# Patient Record
Sex: MALE | Race: WHITE | Employment: UNEMPLOYED | URBAN - METROPOLITAN AREA
[De-identification: names, ages, dates, MRNs, and addresses within clinical notes are randomized per-mention and may not be internally consistent; named-entity substitution may affect disease eponyms.]

---

## 2024-06-06 ENCOUNTER — HOSPITAL ENCOUNTER (EMERGENCY)
Age: 69
Discharge: HOME | End: 2024-06-06
Attending: EMERGENCY MEDICINE
Payer: MEDICARE

## 2024-06-06 ENCOUNTER — APPOINTMENT (OUTPATIENT)
Dept: GENERAL RADIOLOGY | Age: 69
End: 2024-06-06
Payer: MEDICARE

## 2024-06-06 VITALS
RESPIRATION RATE: 16 BRPM | SYSTOLIC BLOOD PRESSURE: 108 MMHG | HEART RATE: 72 BPM | TEMPERATURE: 97.7 F | WEIGHT: 150 LBS | OXYGEN SATURATION: 98 % | HEIGHT: 73 IN | BODY MASS INDEX: 19.88 KG/M2 | DIASTOLIC BLOOD PRESSURE: 62 MMHG

## 2024-06-06 DIAGNOSIS — T85.598A FEEDING TUBE DYSFUNCTION, INITIAL ENCOUNTER: Primary | ICD-10-CM

## 2024-06-06 PROCEDURE — 49465 FLUORO EXAM OF G/COLON TUBE: CPT

## 2024-06-06 PROCEDURE — 43762 RPLC GTUBE NO REVJ TRC: CPT

## 2024-06-06 PROCEDURE — 99283 EMERGENCY DEPT VISIT LOW MDM: CPT

## 2024-06-06 PROCEDURE — 6360000004 HC RX CONTRAST MEDICATION: Performed by: PHYSICIAN ASSISTANT

## 2024-06-06 RX ADMIN — DIATRIZOATE MEGLUMINE AND DIATRIZOATE SODIUM 30 ML: 660; 100 LIQUID ORAL; RECTAL at 10:50

## 2024-06-06 NOTE — ED PROVIDER NOTES
with the patient.       I have reviewed the patient's previous medical records using the electronic health record that we have available.      Labs and imaging were reviewed.   CONSULTS:  None    PROCEDURES:  Procedures        FINAL IMPRESSION      1. Feeding tube dysfunction, initial encounter          DISPOSITION/PLAN   DISPOSITION Decision To Discharge 06/06/2024 11:57:17 AM      PATIENTREFERRED TO:   No follow-up provider specified.    DISCHARGE MEDICATIONS:   There are no discharge medications for this patient.          (Please note that portions of this note were completed with a voice recognition program.  Efforts were made to edit thedictations but occasionally words are mis-transcribed.)    WALTER Gross Matthew Christopher, PA-C  06/06/24 1709      eMERGENCY dEPARTMENT eNCOUnter   Independent Attestation     Pt Name: Devin Arthur  MRN: [id number]  Birthdate 1955  Date of evaluation: 6/6/24     Devin Arthur is a 68 y.o. male with CC: Feeding Tube Problem (Pt states feeding tube fell out PTA. )      Based on the medical record the care appears appropriate.  I was personally available for consultation in the Emergency Department.    Juan Manuel Ibarra MD  Attending Emergency Physician                 Juan Manuel Ibarra MD  06/06/24 3554

## 2024-06-06 NOTE — DISCHARGE INSTRUCTIONS
Take meds as prescribed.  Follow outpatient tomorrow with doctor or by calling [phone] or [phone].   Return to ER immediately if symptoms worsen or persist.

## 2024-06-07 ENCOUNTER — INITIAL CONSULT (OUTPATIENT)
Dept: ONCOLOGY | Age: 69
End: 2024-06-07
Payer: MEDICARE

## 2024-06-07 ENCOUNTER — HOSPITAL ENCOUNTER (OUTPATIENT)
Age: 69
Setting detail: SPECIMEN
Discharge: HOME | End: 2024-06-07
Payer: MEDICARE

## 2024-06-07 ENCOUNTER — TELEPHONE (OUTPATIENT)
Dept: ONCOLOGY | Age: 69
End: 2024-06-07

## 2024-06-07 VITALS
WEIGHT: 155 LBS | HEART RATE: 73 BPM | RESPIRATION RATE: 16 BRPM | BODY MASS INDEX: 20.54 KG/M2 | HEIGHT: 73 IN | DIASTOLIC BLOOD PRESSURE: 64 MMHG | SYSTOLIC BLOOD PRESSURE: 119 MMHG | TEMPERATURE: 96.3 F

## 2024-06-07 DIAGNOSIS — C09.9 TONSILLAR CANCER (HCC): ICD-10-CM

## 2024-06-07 DIAGNOSIS — R97.20 ELEVATED PROSTATE SPECIFIC ANTIGEN (PSA): ICD-10-CM

## 2024-06-07 DIAGNOSIS — C09.9 TONSILLAR CANCER (HCC): Primary | ICD-10-CM

## 2024-06-07 LAB
ALBUMIN SERPL-MCNC: 4.1 G/DL (ref 3.5–5.2)
ALP SERPL-CCNC: 111 U/L (ref 40–129)
ANION GAP SERPL CALCULATED.3IONS-SCNC: 10 MMOL/L (ref 9–17)
AST SERPL-CCNC: 23 U/L
BASOPHILS # BLD: 0.05 K/UL (ref 0–0.2)
BILIRUB SERPL-MCNC: 0.2 MG/DL (ref 0.3–1.2)
BUN SERPL-MCNC: 32 MG/DL (ref 8–23)
CALCIUM SERPL-MCNC: 10.2 MG/DL (ref 8.6–10.4)
CHLORIDE SERPL-SCNC: 100 MMOL/L (ref 98–107)
CO2 SERPL-SCNC: 24 MMOL/L (ref 20–31)
CREAT SERPL-MCNC: 1.4 MG/DL (ref 0.7–1.2)
EOSINOPHIL # BLD: 0.16 K/UL (ref 0–0.4)
EOSINOPHILS RELATIVE PERCENT: 3 % (ref 1–4)
ERYTHROCYTE [DISTWIDTH] IN BLOOD BY AUTOMATED COUNT: 12.7 % (ref 11.8–14.4)
GFR, ESTIMATED: 54 ML/MIN/1.73M2
GLUCOSE SERPL-MCNC: 85 MG/DL (ref 70–99)
HCT VFR BLD AUTO: 31.2 % (ref 40.7–50.3)
HGB BLD-MCNC: 10.1 G/DL (ref 13–17)
IMM GRANULOCYTES # BLD AUTO: 0.05 K/UL (ref 0–0.3)
IMM GRANULOCYTES NFR BLD: 1 %
MAGNESIUM SERPL-MCNC: 2.2 MG/DL (ref 1.6–2.6)
MCH RBC QN AUTO: 32.1 PG (ref 25.2–33.5)
MCHC RBC AUTO-ENTMCNC: 32.4 G/DL (ref 28–38)
MCV RBC AUTO: 99 FL (ref 82.6–102.9)
MONOCYTES NFR BLD: 0.49 K/UL (ref 0.2–0.8)
MONOCYTES NFR BLD: 9 % (ref 1–7)
NEUTROPHILS NFR BLD: 77 % (ref 36–66)
NEUTS SEG NFR BLD: 4.16 K/UL (ref 1.8–7.7)
PLATELET # BLD AUTO: 253 K/UL (ref 138–453)
PMV BLD AUTO: 8.3 FL (ref 8.1–13.5)
POTASSIUM SERPL-SCNC: 4.6 MMOL/L (ref 3.7–5.3)
PROT SERPL-MCNC: 6.6 G/DL (ref 6.4–8.3)
PSA SERPL-MCNC: 11.1 NG/ML (ref 0–4)
RBC # BLD AUTO: 3.15 M/UL (ref 4.21–5.77)
SODIUM SERPL-SCNC: 134 MMOL/L (ref 135–144)

## 2024-06-07 PROCEDURE — 85025 COMPLETE CBC W/AUTO DIFF WBC: CPT

## 2024-06-07 PROCEDURE — 80053 COMPREHEN METABOLIC PANEL: CPT

## 2024-06-07 PROCEDURE — 99204 OFFICE O/P NEW MOD 45 MIN: CPT | Performed by: INTERNAL MEDICINE

## 2024-06-07 PROCEDURE — 83735 ASSAY OF MAGNESIUM: CPT

## 2024-06-07 PROCEDURE — 84153 ASSAY OF PSA TOTAL: CPT

## 2024-06-07 PROCEDURE — 36415 COLL VENOUS BLD VENIPUNCTURE: CPT

## 2024-06-07 PROCEDURE — 99202 OFFICE O/P NEW SF 15 MIN: CPT | Performed by: INTERNAL MEDICINE

## 2024-06-07 RX ORDER — DIPHENHYDRAMINE HCL 25 MG
25 CAPSULE ORAL EVERY 6 HOURS PRN
COMMUNITY

## 2024-06-07 RX ORDER — TAMSULOSIN HYDROCHLORIDE 0.4 MG/1
0.4 CAPSULE ORAL DAILY
COMMUNITY
Start: 2024-01-02

## 2024-06-07 RX ORDER — ACETAMINOPHEN 325 MG/1
325 TABLET ORAL EVERY 6 HOURS PRN
COMMUNITY

## 2024-06-07 RX ORDER — FLUTICASONE PROPIONATE 50 MCG
2 SPRAY, SUSPENSION (ML) NASAL DAILY
COMMUNITY
Start: 2024-03-16 | End: 2025-03-17

## 2024-06-07 RX ORDER — ALPRAZOLAM 0.25 MG/1
0.25 TABLET ORAL 3 TIMES DAILY PRN
COMMUNITY
Start: 2024-02-02

## 2024-06-07 RX ORDER — IBUPROFEN 200 MG
200 TABLET ORAL EVERY 6 HOURS PRN
COMMUNITY

## 2024-06-07 RX ORDER — PANTOPRAZOLE SODIUM 20 MG/1
20 TABLET, DELAYED RELEASE ORAL
COMMUNITY

## 2024-06-07 NOTE — PROGRESS NOTES
Patient ID: Devin Arthur, 1955, [id number], 68 y.o.  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 PET scan shows no distant disease from head and neck although there is potentially prostate primary (biopsy was on hold)     Radiation therapy completed in First week of March 2024.    HISTORY OF PRESENT ILLNESS:    Oncologic History:  Devin Arthur is a 68 y.o. male with recent diagnosis of tonsillar cancer s/p chemoradiation     Patient received his chemoradiation treatment at [place] and now he is moving here to back to [place] a week ago.    Patient has feeding tube in place and mostky getting feeding through tube feeds. He complains of dry mouth and try eat some food orally.    Patient initially presented with worsening pain in the left base of tongue area as well as L neck swelling. He went to ER on 12/8/23 where CT showed 2.7 x 1.8 x 4.8cm enhancing left lateral pharynx and base of tongue mass involving the soft palate. There were also enlarged left (1.8 x 1.3 x 1.8cm, 2.4 x 1.3 x 2.4cm and 1.2 x 0.7 x 2.0cm) and right (1.5 x 0.8 x 1.9cm) lymph nodes. MRI brain was unremarkable.     Diagnosed with p16+ L base of tongue/tonsil SCC - [id number] or stage HERNESTO  - R cervical lymph node biopsy confirms SCC. PET scan shows no distant disease from head and neck although there is potentially prostate primary and then started on cisplatin based chemoradiation completed in March 2024.    Patient has been receiving pain medication with Fentanyl 25 mcg and   oxycodone 15 mg q4-6h prn ( through feeding tube)    Patient was supposed to have restaging PET however he moved to [place] now and is here to establish care.    No past medical history on file.    No past surgical history on file.    Allergies   Allergen Reactions    Grass Pollen(K-O-[id number]-T-Swt Álvaro) Other (See Comments)       Current Outpatient

## 2024-06-07 NOTE — TELEPHONE ENCOUNTER
KATHERINE HERE FOR CONSULTATION  Labs today  Referral to ENT  Palliative care referral  Referral to Nutrition  RTc 2-3 weeks  LABS DONE ON EXIT  REFERRAL FOR DR TREVINO IS ON 6/11/24 @ 10AM  PALLIATIVE REFERRAL WILL REACHOUT TO PT   FOR AN APPT   REFERRAL FOR NUTRITION SHE WILL REACHOUT  TO PT  MD VISIT 6/28/24 @ 11AM  AVS PRINTED W/ INSTRUCTIONS AND GIVEN  TO PT ON EXIT

## 2024-06-18 ENCOUNTER — TELEPHONE (OUTPATIENT)
Dept: ONCOLOGY | Age: 69
End: 2024-06-18

## 2024-06-18 NOTE — TELEPHONE ENCOUNTER
[facility]- MEDICAL NUTRITION THERAPY     Visit Type: Initial  Reason for Assessment: MD Consult- h/o tonsillar cancer, on tube feeding.    NUTRITION RECOMMENDATIONS / MONITORING / EVALUATION  Encouraged small frequent meal intake and continued use of oral nutrition supplements.   Continue TF for now; work towards weaning off as able.  Will monitor PO/TF tolerance, adequacy of intake, weight, and care plans.     Subjective/Current Data:  Devin Arthur is a 68 y.o. male with h/o tonsillar cancer, s/p chemoradiation in [place]. Pt recently moved to [place] and establishing care at [facility]. Writer received referral from Dr Atkinson for \"management of malnutrition and tube feedings\".   Writer called and spoke with patient's spouse, Miryam. Spouse reports patient does not have an appetite, taste is off, and oral intake is minimal (bites). States pt is swallowing ok and is doing exercises daily. Pt remains on tube feeding as main source of nutrition. Currently receives 5-7 cans of Vital AF 1.2 per day via PEG (supplies through [employer]). Tolerating TF well. Wife reports pt also started drinking Boost supplements per ENT recommendation; wife reports currently drinking about 1/day (Boost Original). Writer discussed getting Boost Plus for higher amount of calories/protein per bottle; wife agreeable.   Writer encouraged continuing to transition from TF to oral diet as able; encouraged eating small frequent meals/snacks and use of oral nutrition supplements (Boost Plus, or equivalent) in place of TF. Discussed trying to cut down on amount of TF being provided to help with appetite/oral intake; wife reports she has tried this and patient still is not interested in eating much. Wife reports she is aiming for at least 2000 cals/day via oral diet/TF to help with weight maintenance/gain. Reports his weight range was 174-178 lbs in Dec 2023 (prior to treatment) and went as low as 125 lbs. Current weight is 155 lbs as

## 2024-06-21 ENCOUNTER — HOSPITAL ENCOUNTER (OUTPATIENT)
Dept: NUCLEAR MEDICINE | Age: 69
End: 2024-06-21
Attending: FAMILY MEDICINE
Payer: MEDICARE

## 2024-06-21 DIAGNOSIS — C09.9 TONSIL CANCER (HCC): ICD-10-CM

## 2024-06-21 LAB — GLUCOSE BLD-MCNC: 95 MG/DL (ref 75–110)

## 2024-06-21 PROCEDURE — 3430000000 HC RX DIAGNOSTIC RADIOPHARMACEUTICAL: Performed by: FAMILY MEDICINE

## 2024-06-21 PROCEDURE — 82947 ASSAY GLUCOSE BLOOD QUANT: CPT

## 2024-06-21 PROCEDURE — 2580000003 HC RX 258: Performed by: FAMILY MEDICINE

## 2024-06-21 PROCEDURE — 78815 PET IMAGE W/CT SKULL-THIGH: CPT

## 2024-06-21 PROCEDURE — A9609 HC RX DIAGNOSTIC RADIOPHARMACEUTICAL: HCPCS | Performed by: FAMILY MEDICINE

## 2024-06-21 RX ORDER — FLUDEOXYGLUCOSE F 18 200 MCI/ML
10 INJECTION, SOLUTION INTRAVENOUS
Status: COMPLETED | OUTPATIENT
Start: 2024-06-21 | End: 2024-06-21

## 2024-06-21 RX ORDER — SODIUM CHLORIDE 0.9 % (FLUSH) 0.9 %
10 SYRINGE (ML) INJECTION ONCE
Status: COMPLETED | OUTPATIENT
Start: 2024-06-21 | End: 2024-06-21

## 2024-06-21 RX ADMIN — FLUDEOXYGLUCOSE F 18 10.6 MILLICURIE: 200 INJECTION, SOLUTION INTRAVENOUS at 08:22

## 2024-06-21 RX ADMIN — SODIUM CHLORIDE, PRESERVATIVE FREE 10 ML: 5 INJECTION INTRAVENOUS at 08:17

## 2024-06-28 ENCOUNTER — TELEPHONE (OUTPATIENT)
Dept: UROLOGY | Age: 69
End: 2024-06-28

## 2024-06-28 ENCOUNTER — HOSPITAL ENCOUNTER (OUTPATIENT)
Dept: INFUSION THERAPY | Facility: MEDICAL CENTER | Age: 69
Discharge: HOME | End: 2024-06-28
Payer: MEDICARE

## 2024-06-28 ENCOUNTER — OFFICE VISIT (OUTPATIENT)
Dept: ONCOLOGY | Age: 69
End: 2024-06-28
Payer: MEDICARE

## 2024-06-28 ENCOUNTER — TELEPHONE (OUTPATIENT)
Dept: ONCOLOGY | Age: 69
End: 2024-06-28

## 2024-06-28 VITALS
BODY MASS INDEX: 20.95 KG/M2 | TEMPERATURE: 97 F | DIASTOLIC BLOOD PRESSURE: 68 MMHG | WEIGHT: 158.8 LBS | SYSTOLIC BLOOD PRESSURE: 106 MMHG | RESPIRATION RATE: 18 BRPM | HEART RATE: 60 BPM

## 2024-06-28 DIAGNOSIS — R97.20 ELEVATED PSA: Primary | ICD-10-CM

## 2024-06-28 DIAGNOSIS — C09.9 TONSILLAR CANCER (HCC): Primary | ICD-10-CM

## 2024-06-28 PROCEDURE — 99214 OFFICE O/P EST MOD 30 MIN: CPT | Performed by: INTERNAL MEDICINE

## 2024-06-28 PROCEDURE — 1123F ACP DISCUSS/DSCN MKR DOCD: CPT | Performed by: INTERNAL MEDICINE

## 2024-06-28 PROCEDURE — 99211 OFF/OP EST MAY X REQ PHY/QHP: CPT | Performed by: INTERNAL MEDICINE

## 2024-06-28 PROCEDURE — 2580000003 HC RX 258: Performed by: INTERNAL MEDICINE

## 2024-06-28 PROCEDURE — 96523 IRRIG DRUG DELIVERY DEVICE: CPT

## 2024-06-28 PROCEDURE — 6360000002 HC RX W HCPCS: Performed by: INTERNAL MEDICINE

## 2024-06-28 RX ORDER — EPINEPHRINE 1 MG/ML
0.3 INJECTION, SOLUTION INTRAMUSCULAR; SUBCUTANEOUS PRN
OUTPATIENT
Start: 2024-06-28

## 2024-06-28 RX ORDER — HEPARIN 100 UNIT/ML
500 SYRINGE INTRAVENOUS PRN
OUTPATIENT
Start: 2024-06-28

## 2024-06-28 RX ORDER — EPINEPHRINE 1 MG/ML
0.3 INJECTION, SOLUTION INTRAMUSCULAR; SUBCUTANEOUS PRN
Status: CANCELLED | OUTPATIENT
Start: 2024-06-28

## 2024-06-28 RX ORDER — HEPARIN 100 UNIT/ML
500 SYRINGE INTRAVENOUS PRN
Status: DISCONTINUED | OUTPATIENT
Start: 2024-06-28 | End: 2024-06-29 | Stop reason: HOSPADM

## 2024-06-28 RX ORDER — ALBUTEROL SULFATE 90 UG/1
4 AEROSOL, METERED RESPIRATORY (INHALATION) PRN
OUTPATIENT
Start: 2024-06-28

## 2024-06-28 RX ORDER — ONDANSETRON 2 MG/ML
8 INJECTION INTRAMUSCULAR; INTRAVENOUS
Status: CANCELLED | OUTPATIENT
Start: 2024-06-28

## 2024-06-28 RX ORDER — MIRTAZAPINE 15 MG/1
7.5 TABLET, FILM COATED ORAL NIGHTLY
COMMUNITY

## 2024-06-28 RX ORDER — SODIUM CHLORIDE 9 MG/ML
25 INJECTION, SOLUTION INTRAVENOUS PRN
Status: CANCELLED | OUTPATIENT
Start: 2024-06-28

## 2024-06-28 RX ORDER — DIPHENHYDRAMINE HYDROCHLORIDE 50 MG/ML
50 INJECTION INTRAMUSCULAR; INTRAVENOUS
OUTPATIENT
Start: 2024-06-28

## 2024-06-28 RX ORDER — ALBUTEROL SULFATE 90 UG/1
4 AEROSOL, METERED RESPIRATORY (INHALATION) PRN
Status: CANCELLED | OUTPATIENT
Start: 2024-06-28

## 2024-06-28 RX ORDER — SODIUM CHLORIDE 0.9 % (FLUSH) 0.9 %
5-40 SYRINGE (ML) INJECTION PRN
Status: DISCONTINUED | OUTPATIENT
Start: 2024-06-28 | End: 2024-06-29 | Stop reason: HOSPADM

## 2024-06-28 RX ORDER — SODIUM CHLORIDE 9 MG/ML
INJECTION, SOLUTION INTRAVENOUS CONTINUOUS
OUTPATIENT
Start: 2024-06-28

## 2024-06-28 RX ORDER — SODIUM CHLORIDE 9 MG/ML
25 INJECTION, SOLUTION INTRAVENOUS PRN
OUTPATIENT
Start: 2024-06-28

## 2024-06-28 RX ORDER — ACETAMINOPHEN 325 MG/1
650 TABLET ORAL
Status: CANCELLED | OUTPATIENT
Start: 2024-06-28

## 2024-06-28 RX ORDER — DIPHENHYDRAMINE HYDROCHLORIDE 50 MG/ML
50 INJECTION INTRAMUSCULAR; INTRAVENOUS
Status: CANCELLED | OUTPATIENT
Start: 2024-06-28

## 2024-06-28 RX ORDER — SODIUM CHLORIDE 0.9 % (FLUSH) 0.9 %
5-40 SYRINGE (ML) INJECTION PRN
OUTPATIENT
Start: 2024-06-28

## 2024-06-28 RX ORDER — FAMOTIDINE 10 MG/ML
20 INJECTION, SOLUTION INTRAVENOUS
Status: CANCELLED | OUTPATIENT
Start: 2024-06-28

## 2024-06-28 RX ORDER — ONDANSETRON 2 MG/ML
8 INJECTION INTRAMUSCULAR; INTRAVENOUS
OUTPATIENT
Start: 2024-06-28

## 2024-06-28 RX ORDER — ACETAMINOPHEN 325 MG/1
650 TABLET ORAL
OUTPATIENT
Start: 2024-06-28

## 2024-06-28 RX ORDER — SODIUM CHLORIDE 9 MG/ML
INJECTION, SOLUTION INTRAVENOUS CONTINUOUS
Status: CANCELLED | OUTPATIENT
Start: 2024-06-28

## 2024-06-28 RX ORDER — FAMOTIDINE 10 MG/ML
20 INJECTION, SOLUTION INTRAVENOUS
OUTPATIENT
Start: 2024-06-28

## 2024-06-28 RX ADMIN — SODIUM CHLORIDE, PRESERVATIVE FREE 20 ML: 5 INJECTION INTRAVENOUS at 12:05

## 2024-06-28 RX ADMIN — SODIUM CHLORIDE, PRESERVATIVE FREE 10 ML: 5 INJECTION INTRAVENOUS at 12:00

## 2024-06-28 RX ADMIN — Medication 500 UNITS: at 12:05

## 2024-06-28 NOTE — PROGRESS NOTES
Chin Atkinson MD  Hematologist/Medical Oncologist    On this date 6/28/24  I have spent 40 minutes reviewing previous notes, test results and face to face with the patient discussing the diagnosis and importance of compliance with the treatment plan. Greater than 50% of that time was spent face-to-face with the patient in counseling and coordinating her care.     This note is created with the assistance of a speech recognition program.  While intending to generate a document that actually reflects the content of the visit, the document can still have some errors including those of syntax and sound a like substitutions which may escape proof reading.  It such instances, actual meaning can be extrapolated by contextual diversion.

## 2024-06-28 NOTE — TELEPHONE ENCOUNTER
KATHERINE HERE FOR MD VISIT & TX  Referral urology  RTC July 10th  PORT flush today  REFERRAL FOR UROLOGY WAS CALLED THEY WILL  CALL THE PT FOR AN APPT  PORT FLUSH WAS DONE ON EXIT  MD VISIT 7/10/24 @ 1:15PM  AVS PRINTED W/ INSTRUCTIONS AND GIVEN TO  PT ON EXIT

## 2024-06-28 NOTE — PROGRESS NOTES
Pt seen per md and states has port that needs flushed, not done since March.  Pt tolerated well and very good blood return. De-accessed and discharged per amb with wife with next appts from  per AVS.

## 2024-07-01 ENCOUNTER — CLINICAL DOCUMENTATION (OUTPATIENT)
Facility: HOSPITAL | Age: 69
End: 2024-07-01

## 2024-07-08 ENCOUNTER — OFFICE VISIT (OUTPATIENT)
Dept: UROLOGY | Age: 69
End: 2024-07-08
Payer: MEDICARE

## 2024-07-08 VITALS
BODY MASS INDEX: 21.2 KG/M2 | WEIGHT: 160 LBS | HEIGHT: 73 IN | DIASTOLIC BLOOD PRESSURE: 78 MMHG | SYSTOLIC BLOOD PRESSURE: 118 MMHG

## 2024-07-08 DIAGNOSIS — N40.1 BPH WITH OBSTRUCTION/LOWER URINARY TRACT SYMPTOMS: ICD-10-CM

## 2024-07-08 DIAGNOSIS — N13.8 BPH WITH OBSTRUCTION/LOWER URINARY TRACT SYMPTOMS: ICD-10-CM

## 2024-07-08 DIAGNOSIS — R35.1 NOCTURIA: ICD-10-CM

## 2024-07-08 DIAGNOSIS — R97.20 ELEVATED PSA: Primary | ICD-10-CM

## 2024-07-08 PROCEDURE — 99204 OFFICE O/P NEW MOD 45 MIN: CPT | Performed by: UROLOGY

## 2024-07-08 PROCEDURE — 1123F ACP DISCUSS/DSCN MKR DOCD: CPT | Performed by: UROLOGY

## 2024-07-08 RX ORDER — TAMSULOSIN HYDROCHLORIDE 0.4 MG/1
0.4 CAPSULE ORAL DAILY
Qty: 90 CAPSULE | Refills: 1 | Status: SHIPPED | OUTPATIENT
Start: 2024-07-08

## 2024-07-08 ASSESSMENT — ENCOUNTER SYMPTOMS
VOMITING: 0
ABDOMINAL PAIN: 0
EYE PAIN: 0
COLOR CHANGE: 0
WHEEZING: 0
NAUSEA: 0
SHORTNESS OF BREATH: 0
BACK PAIN: 0
COUGH: 0
EYE REDNESS: 0

## 2024-07-08 NOTE — PROGRESS NOTES
[facility] UROLOGY [facility]  [address]  [facility] [address]  Dept: [phone]  Dept Fax: [phone]    [facility] Urology Office Note - New patient    Patient:  Devin Arthur  YOB: 1955  Date: 7/8/2024    The patient is a 68 y.o. male who presents todayfor evaluation of the following problems:   Chief Complaint   Patient presents with    New Patient     Elevated PSA    referred by Sukhdeep Dunn MD.      HPI  This is a very pleasant 68-year-old gentleman who has an elevated PSA of 11.  His PSA within the last 6 months was as high as 17.  He has seen a few urologists but has not had a prostate biopsy yet because he was undergoing treatment for throat cancer.  He was recently given for an clearance of his throat cancer and is now seeing us for an opinion about his PSA.  He does have some bothersome urinary symptoms.  Most notably he has nocturia 5-6 times per night.  He is very bothered by the symptoms.    (Patient's old records have been requested, reviewed and summarized in today's note.)    Summary of old records: N/A    Additional History: N/A    Procedures Today: N/A    Last several PSA's:  Lab Results   Component Value Date    PSA 11.10 (H) 06/07/2024     Last total testosterone:  No results found for: \"TESTOSTERONE\"  Urinalysis today:  No results found for this visit on 07/08/24.    AUA Symptom Score (7/8/2024):                               Last BUN and creatinine:  Lab Results   Component Value Date    BUN 32 (H) 06/07/2024     Lab Results   Component Value Date    CREATININE 1.4 (H) 06/07/2024       Additional Lab/Culture results: none    Imaging Reviewed during this Office Visit: none  (results were independently reviewed by physician and radiology report verified)    PAST MEDICAL, FAMILY AND SOCIAL HISTORY:  No past medical history on file.  Past Surgical History:   Procedure Laterality Date    SHOULDER SURGERY Right

## 2024-07-08 NOTE — PROGRESS NOTES
Review of Systems   Constitutional:  Negative for activity change, chills and fever.   Eyes:  Negative for pain, redness and visual disturbance.   Respiratory:  Negative for cough, shortness of breath and wheezing.    Cardiovascular:  Negative for chest pain and leg swelling.   Gastrointestinal:  Negative for abdominal pain, nausea and vomiting.   Genitourinary:  Negative for difficulty urinating, dysuria, flank pain, frequency, hematuria, penile discharge, scrotal swelling and testicular pain.   Musculoskeletal:  Negative for back pain, joint swelling and myalgias.   Skin:  Negative for color change and rash.   Neurological:  Negative for dizziness, tremors and numbness.   Hematological:  Negative for adenopathy. Does not bruise/bleed easily.

## 2024-07-10 ENCOUNTER — TELEPHONE (OUTPATIENT)
Dept: ONCOLOGY | Age: 69
End: 2024-07-10

## 2024-07-10 ENCOUNTER — OFFICE VISIT (OUTPATIENT)
Dept: ONCOLOGY | Age: 69
End: 2024-07-10
Payer: MEDICARE

## 2024-07-10 VITALS
RESPIRATION RATE: 18 BRPM | SYSTOLIC BLOOD PRESSURE: 120 MMHG | WEIGHT: 160 LBS | DIASTOLIC BLOOD PRESSURE: 82 MMHG | BODY MASS INDEX: 21.11 KG/M2 | HEART RATE: 60 BPM | TEMPERATURE: 97.4 F

## 2024-07-10 DIAGNOSIS — D64.9 ANEMIA, UNSPECIFIED TYPE: Primary | ICD-10-CM

## 2024-07-10 PROCEDURE — 1123F ACP DISCUSS/DSCN MKR DOCD: CPT | Performed by: INTERNAL MEDICINE

## 2024-07-10 PROCEDURE — 99211 OFF/OP EST MAY X REQ PHY/QHP: CPT | Performed by: INTERNAL MEDICINE

## 2024-07-10 PROCEDURE — 99214 OFFICE O/P EST MOD 30 MIN: CPT | Performed by: INTERNAL MEDICINE

## 2024-07-10 NOTE — TELEPHONE ENCOUNTER
KATHERINE HERE FOR MD VISIT  RTC in 6 week w labs  LAB ORDERS GIVEN TO PT ON EXIT  TO BE DONE 1 WK PRIOR TO RV 8/28/24  MD VISIT 8/28/24 @ 3:15PM  AVS PRINTED W/ INSTRUCTIONS AND GIVEN  TO PT ON EXIT

## 2024-07-10 NOTE — PROGRESS NOTES
tonsillar carcinoma, p16 positive, status post chemoradiation with his treatment completed at [place] now moving to [facility].    I reviewed the lumbar data, imaging studies, outside records, prior records, discussed diagnosis and prognosis and treatment recommendations.    I reviewed the NCCN guidelines and goals of care.    Patient has completed chemoradiation in March of this year and was supposed to have follow-up restaging PET scan.  His PET scan has been ordered by his primary care physician and will be scheduled soon.    Patient still has a feeding tube in place and I will refer him to nutritionist    He will be followed up as per NCCN guidelines    During today's visit, the patient and the family had a number of reasonable questions which were answered to their satisfaction.  They verbalized understanding of the information provided and they agreed to proceed as outlined above.        PLAN:   I reviewed images, discussed diagnosis treatment recommendations   Recent PET scan showed no definite metabolically active tonsillar mass or cervical   Clinically he is recovering slowly   He still has a lot of dryness   He is following with urologist for his elevated PSA   He is following with ENT locally   Labs prior or earlier if needed       Chin Atkinson MD  Hematologist/Medical Oncologist    On this date 7/10/24  I have spent 40 minutes reviewing previous notes, test results and face to face with the patient discussing the diagnosis and importance of compliance with the treatment plan. Greater than 50% of that time was spent face-to-face with the patient in counseling and coordinating her care.     This note is created with the assistance of a speech recognition program.  While intending to generate a document that actually reflects the content of the visit, the document can still have some errors including those of syntax and sound a like substitutions which may escape proof reading.  It such

## 2024-07-15 ENCOUNTER — TELEPHONE (OUTPATIENT)
Dept: UROLOGY | Age: 69
End: 2024-07-15

## 2024-07-15 ENCOUNTER — HOSPITAL ENCOUNTER (OUTPATIENT)
Dept: MRI IMAGING | Age: 69
Discharge: HOME | End: 2024-07-17
Attending: UROLOGY

## 2024-07-15 DIAGNOSIS — R97.20 ELEVATED PSA: Primary | ICD-10-CM

## 2024-07-15 DIAGNOSIS — F40.240 CLAUSTROPHOBIA: ICD-10-CM

## 2024-07-15 DIAGNOSIS — R97.20 ELEVATED PSA: ICD-10-CM

## 2024-07-16 RX ORDER — DIAZEPAM 10 MG
10 TABLET ORAL ONCE
Qty: 1 TABLET | Refills: 0 | Status: SHIPPED | OUTPATIENT
Start: 2024-07-16 | End: 2024-07-16

## 2024-07-19 ENCOUNTER — HOSPITAL ENCOUNTER (OUTPATIENT)
Dept: MRI IMAGING | Age: 69
End: 2024-07-19
Attending: UROLOGY
Payer: MEDICARE

## 2024-07-19 PROCEDURE — 6360000004 HC RX CONTRAST MEDICATION: Performed by: UROLOGY

## 2024-07-19 PROCEDURE — A9576 INJ PROHANCE MULTIPACK: HCPCS | Performed by: UROLOGY

## 2024-07-19 PROCEDURE — 72197 MRI PELVIS W/O & W/DYE: CPT

## 2024-07-19 PROCEDURE — 2580000003 HC RX 258: Performed by: UROLOGY

## 2024-07-19 RX ORDER — 0.9 % SODIUM CHLORIDE 0.9 %
30 INTRAVENOUS SOLUTION INTRAVENOUS ONCE
Status: COMPLETED | OUTPATIENT
Start: 2024-07-19 | End: 2024-07-19

## 2024-07-19 RX ADMIN — GADOTERIDOL 14 ML: 279.3 INJECTION, SOLUTION INTRAVENOUS at 10:49

## 2024-07-19 RX ADMIN — SODIUM CHLORIDE 30 ML: 0.9 INJECTION, SOLUTION INTRAVENOUS at 10:50

## 2024-07-29 ENCOUNTER — OFFICE VISIT (OUTPATIENT)
Dept: UROLOGY | Age: 69
End: 2024-07-29
Payer: MEDICARE

## 2024-07-29 VITALS
SYSTOLIC BLOOD PRESSURE: 124 MMHG | TEMPERATURE: 98.2 F | HEIGHT: 73 IN | DIASTOLIC BLOOD PRESSURE: 76 MMHG | WEIGHT: 160 LBS | BODY MASS INDEX: 21.2 KG/M2 | OXYGEN SATURATION: 98 % | HEART RATE: 72 BPM

## 2024-07-29 DIAGNOSIS — N13.8 BPH WITH OBSTRUCTION/LOWER URINARY TRACT SYMPTOMS: ICD-10-CM

## 2024-07-29 DIAGNOSIS — N40.1 BPH WITH OBSTRUCTION/LOWER URINARY TRACT SYMPTOMS: ICD-10-CM

## 2024-07-29 DIAGNOSIS — R97.20 ELEVATED PSA: Primary | ICD-10-CM

## 2024-07-29 PROCEDURE — 1123F ACP DISCUSS/DSCN MKR DOCD: CPT | Performed by: UROLOGY

## 2024-07-29 PROCEDURE — 99214 OFFICE O/P EST MOD 30 MIN: CPT | Performed by: UROLOGY

## 2024-07-29 RX ORDER — CIPROFLOXACIN 500 MG/1
500 TABLET, FILM COATED ORAL 2 TIMES DAILY
Qty: 6 TABLET | Refills: 0 | Status: SHIPPED | OUTPATIENT
Start: 2024-07-29

## 2024-07-29 ASSESSMENT — ENCOUNTER SYMPTOMS
COUGH: 0
EYE REDNESS: 0
NAUSEA: 0
EYES NEGATIVE: 1
BACK PAIN: 0
ALLERGIC/IMMUNOLOGIC NEGATIVE: 1
VOMITING: 0
EYE PAIN: 0
COLOR CHANGE: 0
GASTROINTESTINAL NEGATIVE: 1
ABDOMINAL PAIN: 0
SHORTNESS OF BREATH: 0
RESPIRATORY NEGATIVE: 1
WHEEZING: 0

## 2024-07-29 NOTE — PROGRESS NOTES
[facility]OLOGY [facility]  [address]  [facility] [address]  Dept: [phone]    [facility] Urology Office Note - Established    Patient:  Devin Arthur  YOB: 1955  Date: 7/29/2024    The patient is a 68 y.o. male who presents todayfor evaluation of the following problems:   Chief Complaint   Patient presents with    Elevated PSA     4 wk f/u prostate mri.         HPI  This is a very pleasant 68-year-old gentleman with an elevated PSA.  He was undergoing treatments for throat cancer and therefore was not seen until a few weeks ago for his elevated PSA.  He does have some bothersome urinary symptoms.  We had ordered a prostate MRI.  He does have a PI-RADS 5 lesion.    Summary of old records: N/A    Additional History: N/A    Procedures Today: N/A    Urinalysis today:  No results found for this visit on 07/29/24.  Last several PSA's:  Lab Results   Component Value Date    PSA 11.10 (H) 06/07/2024     Last total testosterone:  No results found for: \"TESTOSTERONE\"    AUA Symptom Score (7/29/2024):                               Last BUN and creatinine:  Lab Results   Component Value Date    BUN 32 (H) 06/07/2024     Lab Results   Component Value Date    CREATININE 1.4 (H) 06/07/2024       Additional Lab/Culture results: none    Imaging Reviewed during this Office Visit: none  (results were independently reviewed by physician and radiology report verified)    PAST MEDICAL, FAMILY AND SOCIAL HISTORY UPDATE:  No past medical history on file.  Past Surgical History:   Procedure Laterality Date    SHOULDER SURGERY Right 2009     No family history on file.  Outpatient Medications Marked as Taking for the 7/29/24 encounter (Office Visit) with Douglas Silva MD   Medication Sig Dispense Refill    ciprofloxacin (CIPRO) 500 MG tablet Take 1 tablet by mouth 2 times daily Take first dose 1 day prior to prostate biopsy and then twice a day

## 2024-07-30 ENCOUNTER — TELEPHONE (OUTPATIENT)
Dept: UROLOGY | Age: 69
End: 2024-07-30

## 2024-07-30 NOTE — TELEPHONE ENCOUNTER
Fusion Prostate bx & us @ [facility] 9/5/24 8:15am **STOP BLOOD THINNERS 8/29/24**   PAT @ [facility] 8/22/24 10:30am       Dr. Silva ordered antibiotic- patient notified       Spoke with patient, procedure info given to patient

## 2024-07-31 ENCOUNTER — TELEPHONE (OUTPATIENT)
Dept: ONCOLOGY | Age: 69
End: 2024-07-31

## 2024-07-31 ENCOUNTER — INITIAL CONSULT (OUTPATIENT)
Dept: PALLATIVE CARE | Age: 69
End: 2024-07-31

## 2024-07-31 ENCOUNTER — TELEPHONE (OUTPATIENT)
Dept: INFUSION THERAPY | Age: 69
End: 2024-07-31

## 2024-07-31 VITALS
RESPIRATION RATE: 20 BRPM | SYSTOLIC BLOOD PRESSURE: 121 MMHG | DIASTOLIC BLOOD PRESSURE: 71 MMHG | HEIGHT: 76 IN | TEMPERATURE: 97.9 F | WEIGHT: 159 LBS | HEART RATE: 68 BPM | BODY MASS INDEX: 19.36 KG/M2

## 2024-07-31 DIAGNOSIS — C09.9 SQUAMOUS CELL CARCINOMA OF TONSIL (HCC): ICD-10-CM

## 2024-07-31 DIAGNOSIS — F41.1 GENERALIZED ANXIETY DISORDER: ICD-10-CM

## 2024-07-31 DIAGNOSIS — F33.9 EPISODE OF RECURRENT MAJOR DEPRESSIVE DISORDER, UNSPECIFIED DEPRESSION EPISODE SEVERITY (HCC): ICD-10-CM

## 2024-07-31 DIAGNOSIS — N40.1 BENIGN PROSTATIC HYPERPLASIA WITH LOWER URINARY TRACT SYMPTOMS, SYMPTOM DETAILS UNSPECIFIED: ICD-10-CM

## 2024-07-31 DIAGNOSIS — G47.01 INSOMNIA DUE TO MEDICAL CONDITION: Primary | ICD-10-CM

## 2024-07-31 RX ORDER — TRAZODONE HYDROCHLORIDE 50 MG/1
25 TABLET ORAL NIGHTLY
Qty: 4 TABLET | Refills: 0 | Status: SHIPPED | OUTPATIENT
Start: 2024-07-31 | End: 2024-08-08

## 2024-07-31 RX ORDER — LORAZEPAM 0.5 MG/1
0.5 TABLET ORAL EVERY 8 HOURS PRN
Qty: 90 TABLET | Refills: 0 | Status: SHIPPED | OUTPATIENT
Start: 2024-07-31 | End: 2024-08-30

## 2024-07-31 NOTE — TELEPHONE ENCOUNTER
[facility] Oncology Nutrition Note:   Chart reviewed/called patient for nutrition follow-up. No answer. Detailed message left with callback number. Await call back or attempt to contact patient at a later date.

## 2024-07-31 NOTE — PROGRESS NOTES
activity/some disease; full self-care; normal intake; LOC full  ___80% ambulation full; normal activity with effort/some disease; full self-care; normal/reduced intake; LOC full  ___70% ambulation reduced; cannot do normal work/some disease; full self-care; normal/reduce intake; LOC full  _x_60%  Ambulation reduced; Significant disease; Can't do hobbies/housework; intake normal or reduced; occasional assist; LOC full/confusion  ___50%  Mainly sit/lie; Extensive disease; Can't do any work; Considerable assist; intake normal or reduced; LOC full/confusion  ___40%  Mainly in bed; Extensive disease; Mainly assist; intake normal or reduced; LOC full/confusion   ___30%  Bed Bound; Extensive disease; Total care; intake reduced; LOC full/confusion  ___20%  Bed Bound; Extensive disease; Total care; intake minimal; Drowsy/coma  ___10%  Bed Bound; Extensive disease; Total care; Mouth care only; Drowsy/coma  ___0       Death      Plan          Principle Problem/Diagnosis:   Diagnosis Orders   1. Insomnia due to medical condition  traZODone (DESYREL) 50 MG tablet      2. Episode of recurrent major depressive disorder, unspecified depression episode severity (HCC)  LORazepam (ATIVAN) 0.5 MG tablet      3. Squamous cell carcinoma of tonsil (HCC)  Jan Wolfe DO, [facility]      4. Generalized anxiety disorder  LORazepam (ATIVAN) 0.5 MG tablet      5. Benign prostatic hyperplasia with lower urinary tract symptoms, symptom details unspecified             Tonsillar cancer - p16 positive, tonsillar squamous cell carcinoma  Positive right cervical lymph node biopsy  - CT with a 2.7 x 1.8 x 4.8cm enhancing left lateral pharynx and base of tongue mass involving the soft palate.   - MRI unremarkable  - Recent PET scan showed no definite metabolically active tonsillar mass or cervical lymphadenopathy. Activity noted in the right upper lobe likely infectious/inflammatory.   - Completed radiation treatment

## 2024-08-05 DIAGNOSIS — G47.01 INSOMNIA DUE TO MEDICAL CONDITION: ICD-10-CM

## 2024-08-05 RX ORDER — TRAZODONE HYDROCHLORIDE 50 MG/1
50 TABLET ORAL NIGHTLY
Qty: 30 TABLET | Refills: 0 | Status: SHIPPED | OUTPATIENT
Start: 2024-08-05 | End: 2024-09-04

## 2024-08-06 ENCOUNTER — TELEPHONE (OUTPATIENT)
Dept: ONCOLOGY | Age: 69
End: 2024-08-06

## 2024-08-06 NOTE — TELEPHONE ENCOUNTER
[facility] Oncology Nutrition Note:   Received voicemail from patient's spouse, Miryam, requesting writer to call back as able. Writer called spouse back at number provided and also in chart; no answer. Unable to leave a message as mailbox is full.   Will attempt to contact patient/spouse at a later time/date.     Addendum 1450: Spouse returned call.     Subjective information: States patient is now completely off tube feeding x couple weeks now (still has tube in place). Has been able to maintain weight with oral diet and supplements. Currently using Boost Plus 1-2 x/day depending on meal intake. Spouse reports pt still has a dry mouth, but is swallowing most foods ok. Taste is starting to return a little.   Spouse reports current weight is 160 lbs and has been for at least past 3 weeks. Reports usual weight is 172-175 lbs and would like to see him return to this weight if able.     PMH includes: h/o tonsillar cancer, s/p chemoradiation in [place]     Estimated Nutrition Needs:   Estimated Calories/day: 2000 kcal/day  Estimated Protein/day: 85 g pro/day    Anthropometrics:   Height: 6' 4\"  Weight: 160 lbs (72.6 kg)  BMI: 19.52 kg/m2 (Normal Weight)    Wt Readings from Last 3 Encounters:   07/31/24 72.1 kg (159 lb)   07/29/24 72.6 kg (160 lb)   07/16/24 72.7 kg (160 lb 3.2 oz)     Plan: Continue oral diet as tolerated. Continue use of Boost Plus oral nutrition supplements, adjusting daily amount based on meal intake.   Will continue to monitor PO tolerance, intake, and weight.       Barbara Orourke RD, LD, CNSC  Registered Dietitian  [facility]  [phone]

## 2024-08-13 RX ORDER — SODIUM CHLORIDE, SODIUM LACTATE, POTASSIUM CHLORIDE, CALCIUM CHLORIDE 600; 310; 30; 20 MG/100ML; MG/100ML; MG/100ML; MG/100ML
INJECTION, SOLUTION INTRAVENOUS CONTINUOUS
OUTPATIENT
Start: 2024-08-13

## 2024-08-13 NOTE — DISCHARGE INSTRUCTIONS
Pre-operative Instructions           NOTHING to eat or drink after midnight the night prior to surgery   (This includes gum, candy, mints, chewing tobacco, etc). Smoking cessation is always advised.   (Follow bowel prep or diet instructions as/if instructed by your surgeon.)    Please arrive at the surgery center (Entrance B) by 6:15 AM on 9/5/2024 (or as directed by your surgeon's office).  See Directons to Surgery Center on next page.     Please take only the following medication(s) the day of surgery with a small sip of water: pantoprazole (Protonix)    If applicable:   -Use/bring daily inhalers with you   -Do not take diabetic medications on the day of surgery.    Please stop any blood thinning medications  AS DIRECTED BY PRESCRIBING PROVIDER! : diclofenac, ibuprofen     Failure to stop these medications as instructed (too soon or too late) may result in injury to you, or your surgery may need to be rescheduled.   Below is a list of some examples for your reference. This is not an all-inclusive list and if you have any questions/concerns, please check with your surgeon's office.     Antiplatelets : (stop blood cells (called platelets) from sticking together and forming a blood clot):   Aspirin (Bufferin, Ecotrin), Clopidogrel (Plavix), Ticagrelor (Brilinta), Prasugrel (Effient), Dipyridamole/aspirin (Aggrenox), Ticlopidine (Ticlid), Eptifibatide (Integrilin)    Anticoagulants: (slow down your body's process of making clots):   Warfarin (Coumadin), Rivaroxaban (Xarelto), Dabigatran (Pradaxa), Apixaban (Eliquis), Edoxaban (Savaysa), Heparin, Enoxaparin (Lovenox), Fondaparinux (Arixtra)    NSAIDS: Aspirin (Bufferin, Ecotrin), Ibuprofen (Motrin, Nuprin,Advil), Naproxen (Aleve),Meloxicam (Mobic), Celecoxib (Celebrex), Diclofenac (Voltaren), Etodolac (Lodine), Indomethacin, Ketorolac, Nabumetone, Oxaprozin (Daypro), Piroxicam (Feldene), Excedrin (has aspirin in it)    Herbals/supplements: Bromelain, Cinnamon, Cayenne

## 2024-08-22 ENCOUNTER — HOSPITAL ENCOUNTER (OUTPATIENT)
Dept: PREADMISSION TESTING | Age: 69
Discharge: HOME | End: 2024-08-22
Payer: MEDICARE

## 2024-08-22 ENCOUNTER — HOSPITAL ENCOUNTER (OUTPATIENT)
Age: 69
Discharge: HOME | End: 2024-08-22
Payer: MEDICARE

## 2024-08-22 VITALS
BODY MASS INDEX: 20.67 KG/M2 | SYSTOLIC BLOOD PRESSURE: 121 MMHG | WEIGHT: 156 LBS | OXYGEN SATURATION: 96 % | DIASTOLIC BLOOD PRESSURE: 79 MMHG | TEMPERATURE: 96.1 F | HEART RATE: 59 BPM | RESPIRATION RATE: 20 BRPM | HEIGHT: 73 IN

## 2024-08-22 DIAGNOSIS — D64.9 ANEMIA, UNSPECIFIED TYPE: ICD-10-CM

## 2024-08-22 LAB
ALBUMIN SERPL-MCNC: 4.7 G/DL (ref 3.5–5.2)
ALBUMIN/GLOB SERPL: 2 {RATIO} (ref 1–2.5)
ALP SERPL-CCNC: 91 U/L (ref 40–129)
ALT SERPL-CCNC: 22 U/L (ref 10–50)
ANION GAP SERPL CALCULATED.3IONS-SCNC: 10 MMOL/L (ref 9–16)
AST SERPL-CCNC: 29 U/L (ref 10–50)
BASOPHILS # BLD: 0 K/UL (ref 0–0.2)
BASOPHILS NFR BLD: 0 % (ref 0–2)
BILIRUB SERPL-MCNC: 0.5 MG/DL (ref 0–1.2)
BUN SERPL-MCNC: 22 MG/DL (ref 8–23)
CALCIUM SERPL-MCNC: 9.9 MG/DL (ref 8.6–10.4)
CHLORIDE SERPL-SCNC: 100 MMOL/L (ref 98–107)
CO2 SERPL-SCNC: 21 MMOL/L (ref 20–31)
CREAT SERPL-MCNC: 1.6 MG/DL (ref 0.7–1.2)
EKG ATRIAL RATE: 55 BPM
EKG P AXIS: 56 DEGREES
EKG P-R INTERVAL: 232 MS
EKG Q-T INTERVAL: 426 MS
EKG QRS DURATION: 110 MS
EKG QTC CALCULATION (BAZETT): 407 MS
EKG R AXIS: -12 DEGREES
EKG T AXIS: 63 DEGREES
EKG VENTRICULAR RATE: 55 BPM
EOSINOPHIL # BLD: 0.04 K/UL (ref 0–0.4)
EOSINOPHILS RELATIVE PERCENT: 1 % (ref 1–4)
ERYTHROCYTE [DISTWIDTH] IN BLOOD BY AUTOMATED COUNT: 13 % (ref 11.8–14.4)
FERRITIN SERPL-MCNC: 244 NG/ML (ref 30–400)
FOLATE SERPL-MCNC: 10.1 NG/ML (ref 4.8–24.2)
GFR, ESTIMATED: 48 ML/MIN/1.73M2
GLUCOSE SERPL-MCNC: 95 MG/DL (ref 74–99)
HCT VFR BLD AUTO: 32.6 % (ref 40.7–50.3)
HGB BLD-MCNC: 11.2 G/DL (ref 13–17)
IMM GRANULOCYTES # BLD AUTO: 0 K/UL (ref 0–0.3)
IMM GRANULOCYTES NFR BLD: 0 %
IRON SATN MFR SERPL: 17 % (ref 20–55)
IRON SERPL-MCNC: 55 UG/DL (ref 61–157)
LYMPHOCYTES NFR BLD: 0.62 K/UL (ref 1–4.8)
LYMPHOCYTES RELATIVE PERCENT: 16 % (ref 24–44)
MCH RBC QN AUTO: 31.6 PG (ref 25.2–33.5)
MCHC RBC AUTO-ENTMCNC: 34.4 G/DL (ref 28.4–34.8)
MCV RBC AUTO: 92.1 FL (ref 82.6–102.9)
MONOCYTES NFR BLD: 0.27 K/UL (ref 0.1–0.8)
MONOCYTES NFR BLD: 7 % (ref 1–7)
MORPHOLOGY: NORMAL
NEUTROPHILS NFR BLD: 76 % (ref 36–66)
NEUTS SEG NFR BLD: 2.97 K/UL (ref 1.8–7.7)
NRBC BLD-RTO: 0 PER 100 WBC
PLATELET # BLD AUTO: 180 K/UL (ref 138–453)
PMV BLD AUTO: 8.9 FL (ref 8.1–13.5)
POTASSIUM SERPL-SCNC: 4.5 MMOL/L (ref 3.7–5.3)
PROT SERPL-MCNC: 6.9 G/DL (ref 6.6–8.7)
RBC # BLD AUTO: 3.54 M/UL (ref 4.21–5.77)
SODIUM SERPL-SCNC: 131 MMOL/L (ref 136–145)
TIBC SERPL-MCNC: 327 UG/DL (ref 250–450)
UNSATURATED IRON BINDING CAPACITY: 272 UG/DL (ref 112–347)
VIT B12 SERPL-MCNC: 1204 PG/ML (ref 232–1245)
WBC OTHER # BLD: 3.9 K/UL (ref 3.5–11.3)

## 2024-08-22 PROCEDURE — 82746 ASSAY OF FOLIC ACID SERUM: CPT

## 2024-08-22 PROCEDURE — 83540 ASSAY OF IRON: CPT

## 2024-08-22 PROCEDURE — 93005 ELECTROCARDIOGRAM TRACING: CPT | Performed by: ANESTHESIOLOGY

## 2024-08-22 PROCEDURE — 83550 IRON BINDING TEST: CPT

## 2024-08-22 PROCEDURE — 85025 COMPLETE CBC W/AUTO DIFF WBC: CPT

## 2024-08-22 PROCEDURE — 80053 COMPREHEN METABOLIC PANEL: CPT

## 2024-08-22 PROCEDURE — 82607 VITAMIN B-12: CPT

## 2024-08-22 PROCEDURE — 87086 URINE CULTURE/COLONY COUNT: CPT

## 2024-08-22 PROCEDURE — 82728 ASSAY OF FERRITIN: CPT

## 2024-08-22 PROCEDURE — 36415 COLL VENOUS BLD VENIPUNCTURE: CPT

## 2024-08-22 NOTE — PROGRESS NOTES
Obtained oncology and medical clearance for OR on 9/5/2024.   Pt aware. Aware needs bloodwork and f/u. Requesting last lab result from Dr. Norris

## 2024-08-22 NOTE — H&P
History and Physical    Pt Name: Devin Arthur  MRN: [id number]  YOB: 1955  Date of evaluation: 8/22/2024  Primary Care Physician: Sukhdeep Dunn MD    SUBJECTIVE:   History of Chief Complaint:    Devin Arthur is a 68 y.o. male who presents for PAT appointment. Patient complains of elevated PSA, BPH, abnormal MRI, prostate lesion. He reports complaints of urinary frequency and nocturia. Patient has been scheduled for FUSION PROSTATE BIOPSY ULTRASOUND  Allergies  is allergic to grass pollen(k-o-r-t-swt jose roberto).  Medications  Prior to Admission medications    Medication Sig Start Date End Date Taking? Authorizing Provider   Pseudoephedrine HCl (SUDAFED PO) Take by mouth as needed   Yes Santo Wheatley MD   Xylitol (XYLIMELTS MT) Take by mouth as needed   Yes Santo Wheatley MD   traZODone (DESYREL) 50 MG tablet Take 1 tablet by mouth nightly 8/5/24 9/4/24 Yes Brian Killian DO   LORazepam (ATIVAN) 0.5 MG tablet Take 1 tablet by mouth every 8 hours as needed for Anxiety for up to 30 days. Max Daily Amount: 1.5 mg 7/31/24 8/30/24 Yes Brian Killian DO   PSEUDOPHED-CHLOPHEDIANOL-GG PO Take by mouth   Yes Santo Wheatley MD   tamsulosin (FLOMAX) 0.4 MG capsule Take 1 capsule by mouth daily 7/8/24  Yes Douglas Silva MD   mirtazapine (REMERON) 15 MG tablet Take 1 tablet by mouth nightly   Yes Santo Wheatley MD   fluticasone (FLONASE) 50 MCG/ACT nasal spray 2 sprays daily 3/16/24 3/17/25 Yes Santo Wheatley MD   pantoprazole (PROTONIX) 20 MG tablet Take 1 tablet by mouth   Yes Provider, Historical, MD   Pseudoeph-Doxylamine-DM-APAP (NYQUIL PO) Take by mouth   Yes Santo Wheatley MD   ciprofloxacin (CIPRO) 500 MG tablet Take 1 tablet by mouth 2 times daily Take first dose 1 day prior to prostate biopsy and then twice a day thereafter until completed  Patient not taking: Reported on 7/31/2024 7/29/24   Douglas Silva MD   acetaminophen (TYLENOL) 325 MG tablet Take  Xylimelts continuously +reports chronic sinus complaints   RESPIRATORY:   negative for cough, shortness of breath, wheezing     CARDIOVASCULAR:   negative for chest pain, palpitations, syncope, edema     GASTROINTESTINAL:   negative for nausea, vomiting  +has PEG tube- has not used in one month, pt and wife reports weight stable   GENITOURINARY:   negative for incontinence  +per HPI   MUSCULOSKELETAL:   negative for neck or back pain  +chronic hand joint pains   NEUROLOGICAL:   Negative for weakness and tingling  negative for headaches and dizziness     PSYCHIATRIC:   negative for anxiety       OBJECTIVE:   VITALS:  height is 1.854 m (6' 1\") and weight is 70.8 kg (156 lb). His temperature is 96.1 °F (35.6 °C) (abnormal). His blood pressure is 121/79 and his pulse is 59. His respiration is 20 and oxygen saturation is 96%.   CONSTITUTIONAL:alert & oriented x 3, no acute distress. Friendly.    SKIN:  Warm and dry, no rashes on exposed areas of skin, hypopigmentation  HEAD:  Normocephalic, atraumatic   EYES: EOMs intact. PERRL.  EARS:  Equal bilaterally, no edema or thickening, skin is intact without lumps or lesions. No discharge. Hearing grossly WNL.    NOSE:  Nares patent.  No rhinorrhea   MOUTH/THROAT:  Mucous membranes moist, tongue is pink, uvula midline, teeth appear to be intact  NECK:full ROM  LUNGS: Respirations even and non-labored. Clear to auscultation bilaterally, no wheezes, rales, or rhonchi.    CARDIOVASCULAR: Regular rate and rhythm, no murmurs/rubs/gallops   ABDOMEN: soft, non-tender, non-distended, bowel sounds active x 4, PEG tube intact.   EXTREMITIES: No edema bilateral lower extremities.  No varicosities bilateral lower extremities.   NEUROLOGIC: CN II-XII are grossly intact.  Gait not assessed.  Testing:   EK24  Labs pending: drawn 2024  IMPRESSIONS:   Elevated PSA, abnormal MRI    PLANS:   FUSION PROSTATE BIOPSY ULTRASOUND     ALONZO FARIAS - CNP  Electronically signed

## 2024-08-22 NOTE — PROGRESS NOTES
Anesthesia Focused Assessment  Upcoming surgery:   9/5/2024 FUSION PROSTATE BIOPSY ULTRASOUND Douglas Silva MD     History of anesthesia complications:  no  Family history of anesthesia complications:  no    METS functional capacity:   -Moderate/Excellent >4, reports would get some SOB that he attributes to deconditioning since his tonsillar cancer    + Covid-19 test in last 8 weeks? no    STOP-BANG Sleep Apnea Questionnaire  SNORE loudly (heard through closed doors)?   No  TIRED, fatigued, sleepy during daytime?    Yes  OBSERVED stopping breathing during sleep?   No  High blood PRESSURE or being treated?    No    BMI over 35?        No  AGE over 50?        Yes  NECK circumference over 16\"?     No  GENDER (male)?       Yes             Total 3  High risk 5-8  Intermediate risk 3-4  Low risk 0-2    ----------------------------------------------------------------------------------------------------------------------  BECK:                                             no  If yes, machine?:                              Type 1 DM:                                   no  T2DM:                                           no    Coronary Artery Disease:             no  Hypertension:                               no  Defib / AICD / Pacemaker:           no    Renal Failure:                               no  If yes, on dialysis?:                           Active smoker:                              no  Drinks Alcohol:                              no  Illicit drugs:                                    no    Dentition:                                      benign     Past Medical History:   Diagnosis Date    Benign prostatic hyperplasia with lower urinary tract symptoms 07/31/2024    Cancer (HCC)     tonsilar squamous cell    Chronic sinus complaints     Depression     Dry mouth     uses Xylimelts around the clock    Elevated PSA     Generalized anxiety disorder 07/31/2024    History of chemotherapy     tonsillar cancer    History of  radiation therapy     tonsillar    Hypoglycemia     borderline, controled by diet    Port-A-Cath in place     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 Atkinson last visit 7/2024    Uses feeding tube     Wellness examination     Dr. Shaun [place], [place] last visit 6/3/24       Patient was evaluated in PAT & anesthesia guidelines were applied.     NPO guidelines, medication instructions and scheduled arrival time were reviewed with patient and his spouse Miryam.They inquired if Xylimelts could continue to be used prior to biopsy.      I advised patient/patient family to please contact surgeons office, ahead of time if possible, if any new signs or symptoms of illness, infection, rash, etc. Patient/ patient family verbalize understanding.                                                                                                                      Anesthesia contacted:   yes   I spoke with Dr. Ray Patient history and pertinent findings reviewed (as documented above in bold). Per anesthesia, it is ok to continue use of Xylimelts in pre-op period. NPO guidelines still stand. I called and informed the patient's wife, Miryam, she verbalizes understanding.     ALONZO FARIAS CNP  Electronically signed 8/22/2024 at 1:22 PM

## 2024-08-23 LAB
MICROORGANISM SPEC CULT: NO GROWTH
SPECIMEN DESCRIPTION: NORMAL

## 2024-08-27 DIAGNOSIS — G47.01 INSOMNIA DUE TO MEDICAL CONDITION: ICD-10-CM

## 2024-08-27 DIAGNOSIS — F33.9 EPISODE OF RECURRENT MAJOR DEPRESSIVE DISORDER, UNSPECIFIED DEPRESSION EPISODE SEVERITY (HCC): ICD-10-CM

## 2024-08-27 DIAGNOSIS — F41.1 GENERALIZED ANXIETY DISORDER: ICD-10-CM

## 2024-08-27 RX ORDER — LORAZEPAM 0.5 MG/1
0.5 TABLET ORAL EVERY 8 HOURS PRN
Qty: 90 TABLET | Refills: 0 | Status: SHIPPED | OUTPATIENT
Start: 2024-08-30 | End: 2024-09-29

## 2024-08-27 RX ORDER — TRAZODONE HYDROCHLORIDE 50 MG/1
50 TABLET, FILM COATED ORAL NIGHTLY
Qty: 30 TABLET | Refills: 0 | Status: SHIPPED | OUTPATIENT
Start: 2024-08-29 | End: 2024-09-28

## 2024-08-28 ENCOUNTER — OFFICE VISIT (OUTPATIENT)
Dept: ONCOLOGY | Age: 69
End: 2024-08-28
Payer: MEDICARE

## 2024-08-28 ENCOUNTER — HOSPITAL ENCOUNTER (OUTPATIENT)
Dept: INFUSION THERAPY | Facility: MEDICAL CENTER | Age: 69
Discharge: HOME | End: 2024-08-28
Payer: MEDICARE

## 2024-08-28 ENCOUNTER — HOSPITAL ENCOUNTER (OUTPATIENT)
Age: 69
Discharge: HOME | End: 2024-08-28
Payer: MEDICARE

## 2024-08-28 ENCOUNTER — TELEPHONE (OUTPATIENT)
Dept: INFUSION THERAPY | Age: 69
End: 2024-08-28

## 2024-08-28 ENCOUNTER — TELEPHONE (OUTPATIENT)
Dept: ONCOLOGY | Age: 69
End: 2024-08-28

## 2024-08-28 VITALS
WEIGHT: 155 LBS | BODY MASS INDEX: 20.45 KG/M2 | SYSTOLIC BLOOD PRESSURE: 119 MMHG | HEART RATE: 62 BPM | TEMPERATURE: 97.9 F | DIASTOLIC BLOOD PRESSURE: 74 MMHG | RESPIRATION RATE: 16 BRPM

## 2024-08-28 DIAGNOSIS — D53.9 NUTRITIONAL ANEMIA, UNSPECIFIED: ICD-10-CM

## 2024-08-28 DIAGNOSIS — R97.20 ELEVATED PROSTATE SPECIFIC ANTIGEN (PSA): Primary | ICD-10-CM

## 2024-08-28 DIAGNOSIS — Z92.3 HISTORY OF HEAD AND NECK RADIATION: ICD-10-CM

## 2024-08-28 DIAGNOSIS — C09.9 TONSILLAR CANCER (HCC): Primary | ICD-10-CM

## 2024-08-28 DIAGNOSIS — C09.9 TONSILLAR CANCER (HCC): ICD-10-CM

## 2024-08-28 LAB
T4 FREE SERPL-MCNC: 1.1 NG/DL (ref 0.92–1.68)
TSH SERPL DL<=0.05 MIU/L-ACNC: 5.35 UIU/ML (ref 0.27–4.2)

## 2024-08-28 PROCEDURE — 84443 ASSAY THYROID STIM HORMONE: CPT

## 2024-08-28 PROCEDURE — 84439 ASSAY OF FREE THYROXINE: CPT

## 2024-08-28 PROCEDURE — 1123F ACP DISCUSS/DSCN MKR DOCD: CPT | Performed by: INTERNAL MEDICINE

## 2024-08-28 PROCEDURE — 99211 OFF/OP EST MAY X REQ PHY/QHP: CPT

## 2024-08-28 PROCEDURE — 6360000002 HC RX W HCPCS: Performed by: INTERNAL MEDICINE

## 2024-08-28 PROCEDURE — 36415 COLL VENOUS BLD VENIPUNCTURE: CPT

## 2024-08-28 PROCEDURE — 96523 IRRIG DRUG DELIVERY DEVICE: CPT

## 2024-08-28 PROCEDURE — 2580000003 HC RX 258: Performed by: INTERNAL MEDICINE

## 2024-08-28 PROCEDURE — 99214 OFFICE O/P EST MOD 30 MIN: CPT | Performed by: INTERNAL MEDICINE

## 2024-08-28 RX ORDER — HEPARIN 100 UNIT/ML
500 SYRINGE INTRAVENOUS PRN
Status: DISCONTINUED | OUTPATIENT
Start: 2024-08-28 | End: 2024-08-29 | Stop reason: HOSPADM

## 2024-08-28 RX ORDER — ONDANSETRON 2 MG/ML
8 INJECTION INTRAMUSCULAR; INTRAVENOUS
OUTPATIENT
Start: 2024-08-28

## 2024-08-28 RX ORDER — ALBUTEROL SULFATE 90 UG/1
4 AEROSOL, METERED RESPIRATORY (INHALATION) PRN
OUTPATIENT
Start: 2024-08-28

## 2024-08-28 RX ORDER — HEPARIN 100 UNIT/ML
500 SYRINGE INTRAVENOUS PRN
OUTPATIENT
Start: 2024-08-28

## 2024-08-28 RX ORDER — SODIUM CHLORIDE 9 MG/ML
INJECTION, SOLUTION INTRAVENOUS CONTINUOUS
OUTPATIENT
Start: 2024-08-28

## 2024-08-28 RX ORDER — DIPHENHYDRAMINE HYDROCHLORIDE 50 MG/ML
50 INJECTION INTRAMUSCULAR; INTRAVENOUS
OUTPATIENT
Start: 2024-08-28

## 2024-08-28 RX ORDER — SODIUM CHLORIDE 0.9 % (FLUSH) 0.9 %
5-40 SYRINGE (ML) INJECTION PRN
OUTPATIENT
Start: 2024-08-28

## 2024-08-28 RX ORDER — ACETAMINOPHEN 325 MG/1
650 TABLET ORAL
OUTPATIENT
Start: 2024-08-28

## 2024-08-28 RX ORDER — SODIUM CHLORIDE 9 MG/ML
25 INJECTION, SOLUTION INTRAVENOUS PRN
OUTPATIENT
Start: 2024-08-28

## 2024-08-28 RX ORDER — EPINEPHRINE 1 MG/ML
0.3 INJECTION, SOLUTION, CONCENTRATE INTRAVENOUS PRN
OUTPATIENT
Start: 2024-08-28

## 2024-08-28 RX ORDER — SODIUM CHLORIDE 0.9 % (FLUSH) 0.9 %
5-40 SYRINGE (ML) INJECTION PRN
Status: DISCONTINUED | OUTPATIENT
Start: 2024-08-28 | End: 2024-08-29 | Stop reason: HOSPADM

## 2024-08-28 RX ADMIN — SODIUM CHLORIDE, PRESERVATIVE FREE 20 ML: 5 INJECTION INTRAVENOUS at 16:15

## 2024-08-28 RX ADMIN — SODIUM CHLORIDE, PRESERVATIVE FREE 20 ML: 5 INJECTION INTRAVENOUS at 16:12

## 2024-08-28 RX ADMIN — HEPARIN 500 UNITS: 100 SYRINGE at 16:15

## 2024-08-28 NOTE — PROGRESS NOTES
Pt arrives per amb with wife after md visit and add on for port flush and pt tolerated well and no reactions or complaints and blood return present  and pt discharged per amb with wife with AVS from  with next appts.

## 2024-08-28 NOTE — PROGRESS NOTES
Patient ID: Devin Arthur, 1955, [id number], 68 y.o.  Referred by :  AMINATA Luciano  Reason for consultation:   p16+ L base of tongue/tonsil SCC - [id number] or stage HERNESTO  - R cervical lymph node biopsy confirms SCC. PET scan shows no distant disease from head and neck although there is potentially prostate primary (biopsy was on hold)     Radiation therapy completed in First week of March 2024.  PET scan on 6/21/2024 showed resolution of the intense activity in the left tonsillar pillar and resolution of active cervical lymphadenopathy suggesting good response to treatment  Enlarged prostate noted and planning to have prostate biopsy on 9/5/2024    HISTORY OF PRESENT ILLNESS:    Oncologic History:  Devin Arthur is a 68 y.o. male with recent diagnosis of tonsillar cancer s/p chemoradiation     Patient received his chemoradiation treatment at [place] and now he is moving here to back to [place] a week ago.    Patient has feeding tube in place and mostky getting feeding through tube feeds. He complains of dry mouth and try eat some food orally.    Patient initially presented with worsening pain in the left base of tongue area as well as L neck swelling. He went to ER on 12/8/23 where CT showed 2.7 x 1.8 x 4.8cm enhancing left lateral pharynx and base of tongue mass involving the soft palate. There were also enlarged left (1.8 x 1.3 x 1.8cm, 2.4 x 1.3 x 2.4cm and 1.2 x 0.7 x 2.0cm) and right (1.5 x 0.8 x 1.9cm) lymph nodes. MRI brain was unremarkable.     Diagnosed with p16+ L base of tongue/tonsil SCC - [id number] or stage HERNESTO  - R cervical lymph node biopsy confirms SCC. PET scan shows no distant disease from head and neck although there is potentially prostate primary and then started on cisplatin based chemoradiation completed in March 2024.    Patient has been receiving pain medication with Fentanyl 25 mcg and   oxycodone 15 mg q4-6h prn ( through feeding tube)    Patient was supposed to have restaging PET

## 2024-08-28 NOTE — TELEPHONE ENCOUNTER
KATHERINE HERE FOR FOLLOW UP   RTC 4 weeks   IR appt for removal for feeding tube  MD VISIT: 9/25/24 @ 10:30AM   IR: 9/11/24 @ 11AM ARRIVAL @ 9:30AM   AVS PRINTED AND GIVEN ON EXIT

## 2024-08-30 ENCOUNTER — TELEPHONE (OUTPATIENT)
Dept: OTOLARYNGOLOGY | Age: 69
End: 2024-08-30

## 2024-08-30 NOTE — TELEPHONE ENCOUNTER
I phoned and spoke with the wife of Mr. Arthur, Miryam Arthur, to review the results of his thyroid function testing at the request of Dr. Luna.  His TSH is slightly elevated, but his T4 was within normal limits.  At this time Dr. Luna would not want to start thyroid replacement as this is technically not hypothyroidism, rather its subclinical hypothyroidism.  Dr. Luna recommends that they discuss this with his PCP as some providers like to supplement subclinical hypothyroidism.  She states understanding of results given and plans to follow up with his PCP.  Denies any questions or other needs at this time.

## 2024-09-03 NOTE — H&P
Pre-op History and Physical      Patient:  Devin Arthur  MRN: [id number]  YOB: 1955    HISTORY OF PRESENT ILLNESS:     The patient is a 68 y.o. male who presents with elevated PSA, abnormal prostate MRI. Here for Fusion prostate biopsy with US.    Patient's old records, notes and chart reviewed and summarized above.     I independently reviewed the images and verified the radiology reports from:    No results found.      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 Atkinson last visit 7/2024    Uses feeding tube     Wellness examination     Dr. Dunn [place], [place] last visit 6/3/24       Past Surgical History:    Past Surgical History:   Procedure Laterality Date    ABDOMEN SURGERY      feeding tube placed    ARM SURGERY Left     fatty tumor removed    COLONOSCOPY      GASTROSTOMY TUBE PLACEMENT      NECK SURGERY      fatty tumor removed    PORTACATH PLACEMENT      SHOULDER SURGERY Right 2009       Medications Prior to Admission:    Prior to Admission medications    Medication Sig Start Date End Date Taking? Authorizing Provider   LORazepam (ATIVAN) 0.5 MG tablet Take 1 tablet by mouth every 8 hours as needed for Anxiety for up to 30 days. Max Daily Amount: 1.5 mg 8/30/24 9/29/24  Brian Killian DO   traZODone (DESYREL) 50 MG tablet Take 1 tablet by mouth nightly 8/29/24

## 2024-09-03 NOTE — DISCHARGE INSTRUCTIONS
Prostate Biopsy Discharge Instructions:    Complete entire course of antibiotics as prescribed.   Take prescriptions as directed.  No driving while taking narcotic pain medication.  Hold blood thinners after biopsy. May resume anticoagulation on 9/10/24  Please call attending physician or hospital  with questions.  Please call or present to ED for fever >101 F, shaking, chills, intractable nausea and vomiting, or uncontrolled pain.  OK to shower after discharge.  You may see blood in your urine, stools, and semen for up to 6 weeks. This is normal.   Follow up with Dr. Silva in 1-2 weeks to discuss biopsy results. Call office to confirm appointment.

## 2024-09-04 ENCOUNTER — ANESTHESIA EVENT (OUTPATIENT)
Dept: OPERATING ROOM | Age: 69
End: 2024-09-04
Payer: MEDICARE

## 2024-09-05 ENCOUNTER — HOSPITAL ENCOUNTER (OUTPATIENT)
Dept: ULTRASOUND IMAGING | Age: 69
Setting detail: OUTPATIENT SURGERY
Discharge: HOME | End: 2024-09-07
Attending: UROLOGY
Payer: MEDICARE

## 2024-09-05 ENCOUNTER — HOSPITAL ENCOUNTER (OUTPATIENT)
Age: 69
Setting detail: OUTPATIENT SURGERY
Discharge: HOME | End: 2024-09-05
Attending: UROLOGY | Admitting: UROLOGY
Payer: MEDICARE

## 2024-09-05 ENCOUNTER — ANESTHESIA (OUTPATIENT)
Dept: OPERATING ROOM | Age: 69
End: 2024-09-05
Payer: MEDICARE

## 2024-09-05 VITALS
OXYGEN SATURATION: 94 % | DIASTOLIC BLOOD PRESSURE: 77 MMHG | SYSTOLIC BLOOD PRESSURE: 114 MMHG | HEIGHT: 73 IN | HEART RATE: 58 BPM | RESPIRATION RATE: 16 BRPM | TEMPERATURE: 96.8 F | WEIGHT: 155 LBS | BODY MASS INDEX: 20.54 KG/M2

## 2024-09-05 DIAGNOSIS — R93.89 ABNORMAL MRI: ICD-10-CM

## 2024-09-05 DIAGNOSIS — R97.20 ELEVATED PSA: ICD-10-CM

## 2024-09-05 PROCEDURE — 6360000002 HC RX W HCPCS: Performed by: STUDENT IN AN ORGANIZED HEALTH CARE EDUCATION/TRAINING PROGRAM

## 2024-09-05 PROCEDURE — 3600000002 HC SURGERY LEVEL 2 BASE: Performed by: UROLOGY

## 2024-09-05 PROCEDURE — 3700000000 HC ANESTHESIA ATTENDED CARE: Performed by: UROLOGY

## 2024-09-05 PROCEDURE — 76942 ECHO GUIDE FOR BIOPSY: CPT

## 2024-09-05 PROCEDURE — 3700000001 HC ADD 15 MINUTES (ANESTHESIA): Performed by: UROLOGY

## 2024-09-05 PROCEDURE — 2500000003 HC RX 250 WO HCPCS: Performed by: UROLOGY

## 2024-09-05 PROCEDURE — 7100000010 HC PHASE II RECOVERY - FIRST 15 MIN: Performed by: UROLOGY

## 2024-09-05 PROCEDURE — 7100000011 HC PHASE II RECOVERY - ADDTL 15 MIN: Performed by: UROLOGY

## 2024-09-05 PROCEDURE — 2709999900 HC NON-CHARGEABLE SUPPLY: Performed by: UROLOGY

## 2024-09-05 PROCEDURE — 88305 TISSUE EXAM BY PATHOLOGIST: CPT

## 2024-09-05 PROCEDURE — 2500000003 HC RX 250 WO HCPCS: Performed by: NURSE ANESTHETIST, CERTIFIED REGISTERED

## 2024-09-05 PROCEDURE — 3600000012 HC SURGERY LEVEL 2 ADDTL 15MIN: Performed by: UROLOGY

## 2024-09-05 PROCEDURE — 2580000003 HC RX 258: Performed by: NURSE ANESTHETIST, CERTIFIED REGISTERED

## 2024-09-05 PROCEDURE — 6360000002 HC RX W HCPCS: Performed by: NURSE ANESTHETIST, CERTIFIED REGISTERED

## 2024-09-05 RX ORDER — IPRATROPIUM BROMIDE AND ALBUTEROL SULFATE 2.5; .5 MG/3ML; MG/3ML
1 SOLUTION RESPIRATORY (INHALATION)
Status: CANCELLED | OUTPATIENT
Start: 2024-09-05 | End: 2024-09-06

## 2024-09-05 RX ORDER — SODIUM CHLORIDE 0.9 % (FLUSH) 0.9 %
5-40 SYRINGE (ML) INJECTION EVERY 12 HOURS SCHEDULED
Status: CANCELLED | OUTPATIENT
Start: 2024-09-05

## 2024-09-05 RX ORDER — SODIUM CHLORIDE, SODIUM LACTATE, POTASSIUM CHLORIDE, CALCIUM CHLORIDE 600; 310; 30; 20 MG/100ML; MG/100ML; MG/100ML; MG/100ML
INJECTION, SOLUTION INTRAVENOUS CONTINUOUS
Status: CANCELLED | OUTPATIENT
Start: 2024-09-05

## 2024-09-05 RX ORDER — SODIUM CHLORIDE 0.9 % (FLUSH) 0.9 %
5-40 SYRINGE (ML) INJECTION PRN
Status: CANCELLED | OUTPATIENT
Start: 2024-09-05

## 2024-09-05 RX ORDER — PROPOFOL 10 MG/ML
INJECTION, EMULSION INTRAVENOUS CONTINUOUS PRN
Status: DISCONTINUED | OUTPATIENT
Start: 2024-09-05 | End: 2024-09-05 | Stop reason: SDUPTHER

## 2024-09-05 RX ORDER — LIDOCAINE HYDROCHLORIDE 10 MG/ML
INJECTION, SOLUTION INFILTRATION; PERINEURAL PRN
Status: DISCONTINUED | OUTPATIENT
Start: 2024-09-05 | End: 2024-09-05 | Stop reason: ALTCHOICE

## 2024-09-05 RX ORDER — SODIUM CHLORIDE, SODIUM LACTATE, POTASSIUM CHLORIDE, CALCIUM CHLORIDE 600; 310; 30; 20 MG/100ML; MG/100ML; MG/100ML; MG/100ML
INJECTION, SOLUTION INTRAVENOUS CONTINUOUS PRN
Status: DISCONTINUED | OUTPATIENT
Start: 2024-09-05 | End: 2024-09-05 | Stop reason: SDUPTHER

## 2024-09-05 RX ORDER — CIPROFLOXACIN 2 MG/ML
400 INJECTION, SOLUTION INTRAVENOUS ONCE
Status: COMPLETED | OUTPATIENT
Start: 2024-09-05 | End: 2024-09-05

## 2024-09-05 RX ORDER — LIDOCAINE HYDROCHLORIDE 10 MG/ML
INJECTION, SOLUTION EPIDURAL; INFILTRATION; INTRACAUDAL; PERINEURAL PRN
Status: DISCONTINUED | OUTPATIENT
Start: 2024-09-05 | End: 2024-09-05 | Stop reason: SDUPTHER

## 2024-09-05 RX ORDER — SODIUM CHLORIDE, SODIUM LACTATE, POTASSIUM CHLORIDE, CALCIUM CHLORIDE 600; 310; 30; 20 MG/100ML; MG/100ML; MG/100ML; MG/100ML
INJECTION, SOLUTION INTRAVENOUS CONTINUOUS
Status: DISCONTINUED | OUTPATIENT
Start: 2024-09-05 | End: 2024-09-05 | Stop reason: HOSPADM

## 2024-09-05 RX ADMIN — PROPOFOL 100 MCG/KG/MIN: 10 INJECTION, EMULSION INTRAVENOUS at 08:14

## 2024-09-05 RX ADMIN — LIDOCAINE HYDROCHLORIDE 50 MG: 10 INJECTION, SOLUTION EPIDURAL; INFILTRATION; INTRACAUDAL; PERINEURAL at 08:14

## 2024-09-05 RX ADMIN — PROPOFOL 70 MG: 10 INJECTION, EMULSION INTRAVENOUS at 08:13

## 2024-09-05 RX ADMIN — SODIUM CHLORIDE, POTASSIUM CHLORIDE, SODIUM LACTATE AND CALCIUM CHLORIDE: 600; 310; 30; 20 INJECTION, SOLUTION INTRAVENOUS at 08:07

## 2024-09-05 RX ADMIN — CIPROFLOXACIN 400 MG: 200 INJECTION, SOLUTION INTRAVENOUS at 08:14

## 2024-09-05 ASSESSMENT — PAIN - FUNCTIONAL ASSESSMENT: PAIN_FUNCTIONAL_ASSESSMENT: NONE - DENIES PAIN

## 2024-09-05 ASSESSMENT — ENCOUNTER SYMPTOMS: SHORTNESS OF BREATH: 0

## 2024-09-05 NOTE — ANESTHESIA PRE PROCEDURE
GASTROSTOMY TUBE PLACEMENT      NECK SURGERY      fatty tumor removed    PORTACATH PLACEMENT      SHOULDER SURGERY Right 2009       Social History:    Social History     Tobacco Use    Smoking status: Never     Passive exposure: Current    Smokeless tobacco: Never   Substance Use Topics    Alcohol use: Not Currently                                Counseling given: Not Answered      Vital Signs (Current):   Vitals:    09/05/24 0734   BP: 137/82   Pulse: 71   Resp: 16   Temp: 97.3 °F (36.3 °C)   TempSrc: Temporal   SpO2: 98%   Weight: 70.3 kg (155 lb)   Height: 1.854 m (6' 1\")                                              BP Readings from Last 3 Encounters:   09/05/24 137/82   08/28/24 119/74   08/27/24 120/76       NPO Status: Time of last liquid consumption: 2000                        Time of last solid consumption: 2000                        Date of last liquid consumption: 09/04/24                        Date of last solid food consumption: 09/04/24    BMI:   Wt Readings from Last 3 Encounters:   09/05/24 70.3 kg (155 lb)   08/28/24 70.3 kg (155 lb)   08/27/24 70.3 kg (155 lb)     Body mass index is 20.45 kg/m².    CBC:   Lab Results   Component Value Date/Time    WBC 3.9 08/22/2024 11:40 AM    RBC 3.54 08/22/2024 11:40 AM    HGB 11.2 08/22/2024 11:40 AM    HCT 32.6 08/22/2024 11:40 AM    MCV 92.1 08/22/2024 11:40 AM    RDW 13.0 08/22/2024 11:40 AM     08/22/2024 11:40 AM       CMP:   Lab Results   Component Value Date/Time     08/22/2024 11:40 AM    K 4.5 08/22/2024 11:40 AM     08/22/2024 11:40 AM    CO2 21 08/22/2024 11:40 AM    BUN 22 08/22/2024 11:40 AM    CREATININE 1.6 08/22/2024 11:40 AM    LABGLOM 48 08/22/2024 11:40 AM    GLUCOSE 95 08/22/2024 11:40 AM    CALCIUM 9.9 08/22/2024 11:40 AM    BILITOT 0.5 08/22/2024 11:40 AM    ALKPHOS 91 08/22/2024 11:40 AM    AST 29 08/22/2024 11:40 AM    ALT 22 08/22/2024 11:40 AM       POC Tests: No results for input(s): \"POCGLU\", \"POCNA\", \"POCK\",

## 2024-09-05 NOTE — ANESTHESIA POSTPROCEDURE EVALUATION
Department of Anesthesiology  Postprocedure Note    Patient: Devin Arthur  MRN: [id number]  YOB: 1955  Date of evaluation: 9/5/2024    Procedure Summary       Date: 09/05/24 Room / Location: [address]    Anesthesia Start: 0807 Anesthesia Stop: 0835    Procedure: FUSION PROSTATE BIOPSY ULTRASOUND Diagnosis:       Elevated PSA      Abnormal MRI      (Elevated PSA [R97.20])      (Abnormal MRI [R93.89])    Surgeons: Douglas Silva MD Responsible Provider: Mariluz Blankenship MD    Anesthesia Type: MAC ASA Status: 3            Anesthesia Type: No value filed.    Romina Phase I:      Romina Phase II: Romina Score: 10    Anesthesia Post Evaluation    Patient location during evaluation: PACU  Patient participation: complete - patient participated  Level of consciousness: awake  Airway patency: patent  Nausea & Vomiting: no nausea and no vomiting  Cardiovascular status: blood pressure returned to baseline  Respiratory status: acceptable  Hydration status: euvolemic  Pain management: adequate    No notable events documented.

## 2024-09-05 NOTE — OP NOTE
Operative Note      Patient: Devin Arthur  YOB: 1955  MRN: [id number]    Date of Procedure: 9/5/2024    Pre-Op Diagnosis Codes:      * Elevated PSA [R97.20]     * Abnormal MRI [R93.89]    Post-Op Diagnosis: Same       Procedure(s):  FUSION PROSTATE BIOPSY ULTRASOUND    Surgeon(s):  Douglas Silva MD    Assistant:   Resident: Boubacar Sanchez MD    Anesthesia: Monitor Anesthesia Care    Estimated Blood Loss (mL): Minimal    Complications: None    Specimens:   ID Type Source Tests Collected by Time Destination   A : PROSTATE BIOPSY X12 Tissue Prostate SURGICAL PATHOLOGY Douglas Silva MD 9/5/2024 0723    B : LEFT APEX TARGET LESION Tissue Prostate SURGICAL PATHOLOGY Douglas Silva MD 9/5/2024 0724    C : LEFT MID TARGET LESION Tissue Prostate SURGICAL PATHOLOGY Douglas Silva MD 9/5/2024 0804        Implants:  * No implants in log *      Drains: * No LDAs found *    Findings:  70 g prostate gland, 2 PI-RADS 5 lesions on the left Edgard gland, both peripheral zone, mid gland and apex.  Both of these areas appear hypoechoic on ultrasound, 24 core biopsy performed after this.    INDICATIONS:  The patient is a 68 y.o. male who has history of elevated PSA.  He was found to have two PIRADS-5 lesions in MRI.   He is here today for MRI ultrasound fusion biopsy,  the risks, benefits and alternatives were explained and informed consent was signed.     OPERATIVE NARRATIVE:  The patient was brought to the operating room. He was properly identified.  The procedure was confirmed verbally. The patient was administered a monitored anesthetic. He was given Cipro 400mg IV. He was placed in the lateral decubitus position. The ultrasound probe was placed into the rectum and prostatography ensued.  The [employer] workstation was coupled to the ultrasound probe and using the device, a series of ultrasonic images of the prostate, seminal vesicles and base of the bladder were obtained.  These images were then subsequently

## 2024-09-09 LAB — SURGICAL PATHOLOGY REPORT: NORMAL

## 2024-09-10 ENCOUNTER — TELEPHONE (OUTPATIENT)
Dept: ONCOLOGY | Age: 69
End: 2024-09-10

## 2024-09-11 ENCOUNTER — HOSPITAL ENCOUNTER (OUTPATIENT)
Dept: INTERVENTIONAL RADIOLOGY/VASCULAR | Age: 69
Discharge: HOME | End: 2024-09-13

## 2024-09-11 DIAGNOSIS — C09.9 TONSILLAR CANCER (HCC): ICD-10-CM

## 2024-09-16 ENCOUNTER — OFFICE VISIT (OUTPATIENT)
Dept: UROLOGY | Age: 69
End: 2024-09-16
Payer: MEDICARE

## 2024-09-16 VITALS
BODY MASS INDEX: 20.54 KG/M2 | DIASTOLIC BLOOD PRESSURE: 72 MMHG | HEIGHT: 73 IN | HEART RATE: 64 BPM | SYSTOLIC BLOOD PRESSURE: 114 MMHG | WEIGHT: 155 LBS | TEMPERATURE: 98 F | OXYGEN SATURATION: 97 %

## 2024-09-16 DIAGNOSIS — R97.20 ELEVATED PSA: Primary | ICD-10-CM

## 2024-09-16 DIAGNOSIS — C61 PROSTATE CANCER (HCC): ICD-10-CM

## 2024-09-16 PROCEDURE — 1123F ACP DISCUSS/DSCN MKR DOCD: CPT | Performed by: UROLOGY

## 2024-09-16 PROCEDURE — 99214 OFFICE O/P EST MOD 30 MIN: CPT | Performed by: UROLOGY

## 2024-09-16 ASSESSMENT — ENCOUNTER SYMPTOMS
EYE PAIN: 0
VOMITING: 0
EYES NEGATIVE: 1
EYE REDNESS: 0
RESPIRATORY NEGATIVE: 1
NAUSEA: 0
GASTROINTESTINAL NEGATIVE: 1
BACK PAIN: 0
WHEEZING: 0
ALLERGIC/IMMUNOLOGIC NEGATIVE: 1
ABDOMINAL PAIN: 0
SHORTNESS OF BREATH: 0
COLOR CHANGE: 0
COUGH: 0

## 2024-09-25 ENCOUNTER — OFFICE VISIT (OUTPATIENT)
Dept: PALLATIVE CARE | Age: 69
End: 2024-09-25
Payer: MEDICARE

## 2024-09-25 VITALS
WEIGHT: 163.2 LBS | TEMPERATURE: 97.8 F | HEART RATE: 60 BPM | BODY MASS INDEX: 21.63 KG/M2 | SYSTOLIC BLOOD PRESSURE: 123 MMHG | OXYGEN SATURATION: 98 % | HEIGHT: 73 IN | DIASTOLIC BLOOD PRESSURE: 80 MMHG

## 2024-09-25 DIAGNOSIS — C09.9 SQUAMOUS CELL CARCINOMA OF TONSIL (HCC): ICD-10-CM

## 2024-09-25 DIAGNOSIS — F41.1 GENERALIZED ANXIETY DISORDER: ICD-10-CM

## 2024-09-25 DIAGNOSIS — F33.9 EPISODE OF RECURRENT MAJOR DEPRESSIVE DISORDER, UNSPECIFIED DEPRESSION EPISODE SEVERITY (HCC): ICD-10-CM

## 2024-09-25 DIAGNOSIS — G47.01 INSOMNIA DUE TO MEDICAL CONDITION: Primary | ICD-10-CM

## 2024-09-25 PROCEDURE — 99214 OFFICE O/P EST MOD 30 MIN: CPT | Performed by: INTERNAL MEDICINE

## 2024-09-25 PROCEDURE — 1123F ACP DISCUSS/DSCN MKR DOCD: CPT | Performed by: INTERNAL MEDICINE

## 2024-09-25 RX ORDER — LORAZEPAM 1 MG/1
1 TABLET ORAL EVERY 8 HOURS PRN
Qty: 42 TABLET | Refills: 0 | Status: SHIPPED | OUTPATIENT
Start: 2024-09-25 | End: 2024-10-09

## 2024-09-25 RX ORDER — TRAZODONE HYDROCHLORIDE 50 MG/1
50 TABLET, FILM COATED ORAL NIGHTLY
Qty: 30 TABLET | Refills: 0 | Status: SHIPPED | OUTPATIENT
Start: 2024-09-25 | End: 2024-10-25

## 2024-09-25 RX ORDER — AMOXICILLIN 500 MG/1
CAPSULE ORAL
COMMUNITY
Start: 2024-09-19

## 2024-09-27 ENCOUNTER — TELEPHONE (OUTPATIENT)
Dept: ONCOLOGY | Age: 69
End: 2024-09-27

## 2024-09-27 ENCOUNTER — OFFICE VISIT (OUTPATIENT)
Dept: ONCOLOGY | Age: 69
End: 2024-09-27
Payer: MEDICARE

## 2024-09-27 VITALS
BODY MASS INDEX: 21.2 KG/M2 | DIASTOLIC BLOOD PRESSURE: 73 MMHG | TEMPERATURE: 97.4 F | SYSTOLIC BLOOD PRESSURE: 119 MMHG | WEIGHT: 160.7 LBS | HEART RATE: 70 BPM | RESPIRATION RATE: 16 BRPM

## 2024-09-27 DIAGNOSIS — R53.83 FATIGUE, UNSPECIFIED TYPE: ICD-10-CM

## 2024-09-27 DIAGNOSIS — C09.9 TONSILLAR CANCER (HCC): Primary | ICD-10-CM

## 2024-09-27 PROCEDURE — 99214 OFFICE O/P EST MOD 30 MIN: CPT | Performed by: INTERNAL MEDICINE

## 2024-09-27 PROCEDURE — 99211 OFF/OP EST MAY X REQ PHY/QHP: CPT | Performed by: INTERNAL MEDICINE

## 2024-09-27 PROCEDURE — 1123F ACP DISCUSS/DSCN MKR DOCD: CPT | Performed by: INTERNAL MEDICINE

## 2024-10-02 ENCOUNTER — HOSPITAL ENCOUNTER (OUTPATIENT)
Dept: NUCLEAR MEDICINE | Age: 69
Discharge: HOME | End: 2024-10-04
Attending: UROLOGY
Payer: MEDICARE

## 2024-10-02 DIAGNOSIS — C61 PROSTATE CANCER (HCC): ICD-10-CM

## 2024-10-02 PROCEDURE — 78815 PET IMAGE W/CT SKULL-THIGH: CPT

## 2024-10-02 PROCEDURE — 3430000000 HC RX DIAGNOSTIC RADIOPHARMACEUTICAL: Performed by: UROLOGY

## 2024-10-02 PROCEDURE — 2580000003 HC RX 258: Performed by: UROLOGY

## 2024-10-02 PROCEDURE — A9595 HC RX DIAGNOSTIC RADIOPHARMACEUTICAL: HCPCS | Performed by: UROLOGY

## 2024-10-02 RX ORDER — SODIUM CHLORIDE 0.9 % (FLUSH) 0.9 %
10 SYRINGE (ML) INJECTION PRN
Status: DISCONTINUED | OUTPATIENT
Start: 2024-10-02 | End: 2024-10-05 | Stop reason: HOSPADM

## 2024-10-02 RX ADMIN — PIFLUFOLASTAT F-18 10.08 MILLICURIE: 80 INJECTION INTRAVENOUS at 12:45

## 2024-10-02 RX ADMIN — SODIUM CHLORIDE, PRESERVATIVE FREE 10 ML: 5 INJECTION INTRAVENOUS at 13:01

## 2024-10-03 ENCOUNTER — OFFICE VISIT (OUTPATIENT)
Dept: PODIATRY | Age: 69
End: 2024-10-03
Payer: MEDICARE

## 2024-10-03 VITALS — WEIGHT: 160 LBS | HEIGHT: 73 IN | BODY MASS INDEX: 21.2 KG/M2

## 2024-10-03 DIAGNOSIS — M79.675 PAIN IN TOE OF LEFT FOOT: ICD-10-CM

## 2024-10-03 DIAGNOSIS — B35.1 ONYCHOMYCOSIS OF TOENAIL: Primary | ICD-10-CM

## 2024-10-03 PROCEDURE — 1123F ACP DISCUSS/DSCN MKR DOCD: CPT | Performed by: PODIATRIST

## 2024-10-03 PROCEDURE — 99203 OFFICE O/P NEW LOW 30 MIN: CPT | Performed by: PODIATRIST

## 2024-10-03 ASSESSMENT — ENCOUNTER SYMPTOMS
SHORTNESS OF BREATH: 0
BACK PAIN: 0
DIARRHEA: 0
NAUSEA: 0
COLOR CHANGE: 0

## 2024-10-03 NOTE — PROGRESS NOTES
Devin Arthur is a 68 y.o. male who presents to the office today with chief complaint of thickness to the left great toenail.  Chief Complaint   Patient presents with    Nail Problem     Left hallux/ last seen Dr. Sukhdeep Dunn 6/18/2024    Foot Pain     Outside of left foot painful    Symptoms began about 6 year(s) ago. Patient complains of injury to the left foot. Patient states that he had an injury that damaged his left hallux toenail and the nail is now thick and painful with shoe gear and ambulation. Pain is rated 3 out of 10 at it's worst and is described as intermittent. Treatments prior to today's visit include: None. Patient states that he gets pain to the outside of the left foot with shoe wear. Finally, the patient experiences pain and tightness to the forefoot of both feet.      Allergies   Allergen Reactions    Grass Pollen(K-O-R-T-Swt Álvaro) Other (See Comments)     Sinus problems       Past Medical History:   Diagnosis Date    Benign prostatic hyperplasia with lower urinary tract symptoms 07/31/2024    Cancer (HCC)     tonsilar squamous cell    Chronic sinus complaints     Depression     Dry mouth     uses Xylimelts around the clock    Elevated PSA     Generalized anxiety disorder 07/31/2024    History of chemotherapy     tonsillar cancer    History of radiation therapy     tonsillar    Hypoglycemia     borderline, controled by diet    Port-A-Cath in place     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 Atkinson last visit 7/2024    Uses feeding tube     Wellness examination     Dr. Dunn [place], [place] last visit 6/3/24       Prior to Admission medications    Medication Sig Start Date End Date Taking? Authorizing Provider   LORazepam (ATIVAN) 1 MG tablet Take 1 tablet by mouth every 8 hours as needed for Anxiety for up to 14 days. Max Daily Amount: 3 mg 9/25/24 10/9/24 Yes Maria D

## 2024-10-07 ENCOUNTER — OFFICE VISIT (OUTPATIENT)
Dept: UROLOGY | Age: 69
End: 2024-10-07
Payer: MEDICARE

## 2024-10-07 VITALS
DIASTOLIC BLOOD PRESSURE: 82 MMHG | SYSTOLIC BLOOD PRESSURE: 138 MMHG | TEMPERATURE: 97.2 F | OXYGEN SATURATION: 96 % | HEART RATE: 67 BPM | BODY MASS INDEX: 21.6 KG/M2 | HEIGHT: 73 IN | WEIGHT: 163 LBS

## 2024-10-07 DIAGNOSIS — N40.1 BPH WITH OBSTRUCTION/LOWER URINARY TRACT SYMPTOMS: ICD-10-CM

## 2024-10-07 DIAGNOSIS — N13.8 BPH WITH OBSTRUCTION/LOWER URINARY TRACT SYMPTOMS: ICD-10-CM

## 2024-10-07 DIAGNOSIS — C61 PROSTATE CANCER (HCC): Primary | ICD-10-CM

## 2024-10-07 DIAGNOSIS — R97.20 ELEVATED PSA: ICD-10-CM

## 2024-10-07 PROCEDURE — 99214 OFFICE O/P EST MOD 30 MIN: CPT | Performed by: UROLOGY

## 2024-10-07 PROCEDURE — 1123F ACP DISCUSS/DSCN MKR DOCD: CPT | Performed by: UROLOGY

## 2024-10-07 ASSESSMENT — ENCOUNTER SYMPTOMS
EYE REDNESS: 0
CONSTIPATION: 0
SHORTNESS OF BREATH: 0
VOMITING: 0
BACK PAIN: 0
NAUSEA: 0
ABDOMINAL PAIN: 0
DIARRHEA: 0
EYE PAIN: 0
COUGH: 0
WHEEZING: 0

## 2024-10-07 NOTE — PROGRESS NOTES
[facility]YSIC[facility]ANS [facility], [facility]  [address]  [facility] [address]  Dept: [phone]    [facility] Urology Office Note - Established    Patient:  Devin Arthur  YOB: 1955  Date: 10/7/2024    The patient is a 68 y.o. male who presents todayfor evaluation of the following problems:   Chief Complaint   Patient presents with    Other     2 Week with PSMA        HPI  This is a very pleasant 68-year-old gentleman who has a history of prostate cancer.  He has high risk disease.  He recently had a PSMA PET scan which does not show metastatic disease.  He is here with his wife to discuss management.  We did spend 40 minutes face-to-face discussing options along with their pros and cons.    Summary of old records: N/A    Additional History: N/A    Procedures Today: N/A    Urinalysis today:  No results found for this visit on 10/07/24.  Last several PSA's:  Lab Results   Component Value Date    PSA 11.10 (H) 06/07/2024     Last total testosterone:  No results found for: \"TESTOSTERONE\"    AUA Symptom Score (10/7/2024):                               Last BUN and creatinine:  Lab Results   Component Value Date    BUN 22 08/22/2024     Lab Results   Component Value Date    CREATININE 1.6 (H) 08/22/2024       Additional Lab/Culture results: none    Imaging Reviewed during this Office Visit: none  (results were independently reviewed by physician and radiology report verified)    PAST MEDICAL, FAMILY AND SOCIAL HISTORY UPDATE:  Past Medical History:   Diagnosis Date    Benign prostatic hyperplasia with lower urinary tract symptoms 07/31/2024    Cancer (HCC)     tonsilar squamous cell    Chronic sinus complaints     Depression     Dry mouth     uses Xylimelts around the clock    Elevated PSA     Generalized anxiety disorder 07/31/2024    History of chemotherapy     tonsillar cancer    History of radiation therapy     tonsillar    Hypoglycemia

## 2024-10-08 RX ORDER — SODIUM CHLORIDE, SODIUM LACTATE, POTASSIUM CHLORIDE, CALCIUM CHLORIDE 600; 310; 30; 20 MG/100ML; MG/100ML; MG/100ML; MG/100ML
INJECTION, SOLUTION INTRAVENOUS CONTINUOUS
OUTPATIENT
Start: 2024-10-08

## 2024-10-08 NOTE — DISCHARGE INSTRUCTIONS
Pre-operative Instructions           NOTHING to eat or drink after midnight the night prior to surgery   (This includes gum, candy, mints, chewing tobacco, etc). Smoking cessation is always advised.   (Follow bowel prep or diet instructions as/if instructed by your surgeon.)    Please arrive at the surgery center (Entrance B) by 6:30 AM on 11/1/2024 (or as directed by your surgeon's office).  See Directons to Surgery Center on next page.     Please take only the following medication(s) the day of surgery with a small sip of water: pantoprazole (Protonix)    If applicable:   -Use/bring daily inhalers with you   -Do not take diabetic medications on the day of surgery.    Please stop any blood thinning medications  AS DIRECTED BY PRESCRIBING PROVIDER! : diclofenac, ibuprofen     Failure to stop these medications as instructed (too soon or too late) may result in injury to you, or your surgery may need to be rescheduled.   Below is a list of some examples for your reference. This is not an all-inclusive list and if you have any questions/concerns, please check with your surgeon's office.     Antiplatelets : (stop blood cells (called platelets) from sticking together and forming a blood clot):   Aspirin (Bufferin, Ecotrin), Clopidogrel (Plavix), Ticagrelor (Brilinta), Prasugrel (Effient), Dipyridamole/aspirin (Aggrenox), Ticlopidine (Ticlid), Eptifibatide (Integrilin)    Anticoagulants: (slow down your body's process of making clots):   Warfarin (Coumadin), Rivaroxaban (Xarelto), Dabigatran (Pradaxa), Apixaban (Eliquis), Edoxaban (Savaysa), Heparin, Enoxaparin (Lovenox), Fondaparinux (Arixtra)    NSAIDS: Aspirin (Bufferin, Ecotrin), Ibuprofen (Motrin, Nuprin,Advil), Naproxen (Aleve),Meloxicam (Mobic), Celecoxib (Celebrex), Diclofenac (Voltaren), Etodolac (Lodine), Indomethacin, Ketorolac, Nabumetone, Oxaprozin (Daypro), Piroxicam (Feldene), Excedrin (has aspirin in it)    Herbals/supplements: Bromelain, Cinnamon, Cayenne

## 2024-10-09 ENCOUNTER — HOSPITAL ENCOUNTER (OUTPATIENT)
Age: 69
Setting detail: SPECIMEN
Discharge: HOME | End: 2024-10-09
Payer: MEDICARE

## 2024-10-09 ENCOUNTER — TELEPHONE (OUTPATIENT)
Dept: UROLOGY | Age: 69
End: 2024-10-09

## 2024-10-09 DIAGNOSIS — C09.9 TONSILLAR CANCER (HCC): ICD-10-CM

## 2024-10-09 DIAGNOSIS — F33.9 EPISODE OF RECURRENT MAJOR DEPRESSIVE DISORDER, UNSPECIFIED DEPRESSION EPISODE SEVERITY (HCC): ICD-10-CM

## 2024-10-09 DIAGNOSIS — F41.1 GENERALIZED ANXIETY DISORDER: Primary | ICD-10-CM

## 2024-10-09 DIAGNOSIS — R53.83 FATIGUE, UNSPECIFIED TYPE: ICD-10-CM

## 2024-10-09 LAB
ALBUMIN SERPL-MCNC: 4.4 G/DL (ref 3.5–5.2)
ALP SERPL-CCNC: 89 U/L (ref 40–129)
ALT SERPL-CCNC: 18 U/L (ref 5–41)
ANION GAP SERPL CALCULATED.3IONS-SCNC: 9 MMOL/L (ref 9–17)
AST SERPL-CCNC: 24 U/L
BASOPHILS # BLD: 0.03 K/UL (ref 0–0.2)
BASOPHILS NFR BLD: 1 % (ref 0–2)
BILIRUB SERPL-MCNC: 0.7 MG/DL (ref 0.3–1.2)
BUN SERPL-MCNC: 22 MG/DL (ref 8–23)
BUN/CREAT SERPL: 15 (ref 9–20)
CALCIUM SERPL-MCNC: 10.1 MG/DL (ref 8.6–10.4)
CHLORIDE SERPL-SCNC: 98 MMOL/L (ref 98–107)
CO2 SERPL-SCNC: 22 MMOL/L (ref 20–31)
CREAT SERPL-MCNC: 1.5 MG/DL (ref 0.7–1.2)
EOSINOPHIL # BLD: 0.08 K/UL (ref 0–0.44)
EOSINOPHILS RELATIVE PERCENT: 3 % (ref 1–4)
ERYTHROCYTE [DISTWIDTH] IN BLOOD BY AUTOMATED COUNT: 12.4 % (ref 11.8–14.4)
FERRITIN SERPL-MCNC: 235 NG/ML (ref 30–400)
GFR, ESTIMATED: 50 ML/MIN/1.73M2
GLUCOSE SERPL-MCNC: 95 MG/DL (ref 70–99)
HCT VFR BLD AUTO: 32.8 % (ref 40.7–50.3)
HGB BLD-MCNC: 11.5 G/DL (ref 13–17)
IMM GRANULOCYTES # BLD AUTO: 0 K/UL (ref 0–0.3)
IMM GRANULOCYTES NFR BLD: 0 %
IRON SATN MFR SERPL: 17 % (ref 20–55)
IRON SERPL-MCNC: 53 UG/DL (ref 61–157)
LYMPHOCYTES NFR BLD: 0.34 K/UL (ref 1.1–3.7)
LYMPHOCYTES RELATIVE PERCENT: 13 % (ref 24–43)
MCH RBC QN AUTO: 32.5 PG (ref 25.2–33.5)
MCHC RBC AUTO-ENTMCNC: 35.1 G/DL (ref 28.4–34.8)
MCV RBC AUTO: 92.7 FL (ref 82.6–102.9)
MONOCYTES NFR BLD: 0.26 K/UL (ref 0.1–1.2)
MONOCYTES NFR BLD: 10 % (ref 3–12)
NEUTROPHILS NFR BLD: 73 % (ref 36–65)
NEUTS SEG NFR BLD: 1.89 K/UL (ref 1.5–8.1)
NRBC BLD-RTO: 0 PER 100 WBC
PLATELET # BLD AUTO: 173 K/UL (ref 138–453)
PMV BLD AUTO: 8.8 FL (ref 8.1–13.5)
POTASSIUM SERPL-SCNC: 4.2 MMOL/L (ref 3.7–5.3)
PROT SERPL-MCNC: 6.5 G/DL (ref 6.4–8.3)
RBC # BLD AUTO: 3.54 M/UL (ref 4.21–5.77)
SODIUM SERPL-SCNC: 129 MMOL/L (ref 135–144)
T4 FREE SERPL-MCNC: 1.2 NG/DL (ref 0.9–1.7)
TIBC SERPL-MCNC: 313 UG/DL (ref 250–450)
TSH SERPL DL<=0.05 MIU/L-ACNC: 5.73 UIU/ML (ref 0.3–5)
UNSATURATED IRON BINDING CAPACITY: 260 UG/DL (ref 112–347)
WBC OTHER # BLD: 2.6 K/UL (ref 3.5–11.3)

## 2024-10-09 PROCEDURE — 84439 ASSAY OF FREE THYROXINE: CPT

## 2024-10-09 PROCEDURE — 82728 ASSAY OF FERRITIN: CPT

## 2024-10-09 PROCEDURE — 84443 ASSAY THYROID STIM HORMONE: CPT

## 2024-10-09 PROCEDURE — 36415 COLL VENOUS BLD VENIPUNCTURE: CPT

## 2024-10-09 PROCEDURE — 85025 COMPLETE CBC W/AUTO DIFF WBC: CPT

## 2024-10-09 PROCEDURE — 83550 IRON BINDING TEST: CPT

## 2024-10-09 PROCEDURE — 80053 COMPREHEN METABOLIC PANEL: CPT

## 2024-10-09 PROCEDURE — 83540 ASSAY OF IRON: CPT

## 2024-10-09 RX ORDER — LORAZEPAM 0.5 MG/1
0.5 TABLET ORAL EVERY 8 HOURS PRN
Qty: 90 TABLET | Refills: 0 | Status: SHIPPED | OUTPATIENT
Start: 2024-10-09 | End: 2024-11-08

## 2024-10-09 NOTE — TELEPHONE ENCOUNTER
Decreasing dose of lorazepam to 0.5 mg q 8 h prn for anxiety due to dizziness. Refilling x 1 month.    Serge Killian, DO  Hospice and Palliative Medicine

## 2024-10-09 NOTE — TELEPHONE ENCOUNTER
Robotic Prostatectomy with PLND @ 11/01/24 8:30am   **STOP BLOOD THINNERS on 10/25/24**  **CLEAR LIQUID DIET on 10/31/24**    PAT: 10/15/24 @ 9:30am   Pre teaching: 10/15/24 @ 1:00pm   Cystogram: 11/07/24 @ 9:30am   Post Op: 11/07/24 @ 10:30am     Spoke with patient in office, procedure information given to patient.

## 2024-10-11 ENCOUNTER — OFFICE VISIT (OUTPATIENT)
Dept: ONCOLOGY | Age: 69
End: 2024-10-11
Payer: MEDICARE

## 2024-10-11 ENCOUNTER — TELEPHONE (OUTPATIENT)
Dept: ONCOLOGY | Age: 69
End: 2024-10-11

## 2024-10-11 VITALS
RESPIRATION RATE: 16 BRPM | HEART RATE: 72 BPM | SYSTOLIC BLOOD PRESSURE: 121 MMHG | WEIGHT: 157.3 LBS | BODY MASS INDEX: 20.76 KG/M2 | DIASTOLIC BLOOD PRESSURE: 74 MMHG | TEMPERATURE: 96.3 F

## 2024-10-11 DIAGNOSIS — R53.83 FATIGUE, UNSPECIFIED TYPE: ICD-10-CM

## 2024-10-11 DIAGNOSIS — C09.9 TONSILLAR CANCER (HCC): Primary | ICD-10-CM

## 2024-10-11 PROCEDURE — 1123F ACP DISCUSS/DSCN MKR DOCD: CPT | Performed by: INTERNAL MEDICINE

## 2024-10-11 PROCEDURE — 99211 OFF/OP EST MAY X REQ PHY/QHP: CPT | Performed by: INTERNAL MEDICINE

## 2024-10-11 PROCEDURE — 99214 OFFICE O/P EST MOD 30 MIN: CPT | Performed by: INTERNAL MEDICINE

## 2024-10-11 PROCEDURE — 99211 OFF/OP EST MAY X REQ PHY/QHP: CPT

## 2024-10-11 RX ORDER — FERROUS SULFATE 325(65) MG
325 TABLET ORAL
Qty: 90 TABLET | Refills: 1 | Status: SHIPPED | OUTPATIENT
Start: 2024-10-11

## 2024-10-11 NOTE — TELEPHONE ENCOUNTER
KATHERINE HERE FOR FOLLOW UP   RTC mid November w labs prior  LABS ORDERED: CBC CMP TSH W/ REFLEX FT4   MD VISIT: 11/15/24 @ 10:30AM   LABS PRINTED AND GIVEN ON EXIT   AVS PRINTED AND GIVEN ON EXIT

## 2024-10-11 NOTE — PROGRESS NOTES
3+4=7 (grade group 2), with 20% pattern  4, involving 70% of total volume.    N.  PROSTATE, LEFT MID TARGET LESION, NEEDLE CORE BIOPSY:  Prostatic  adenocarcinoma, [place] score 3+5=8 (grade group 4), with 30% pattern  5, involving 80% of total volume. Perineural invasion present.  Carcinoma approaches adipose tissue, suggestive of extraprostatic  extension. See comment.       IMAGING DATA:      PET CT TUMOR IMAGE SKULL THIGH PSMA  Narrative: EXAMINATION:  PROSTATE PET SKULL BASE TO MID THIGHS    10/2/2024    TECHNIQUE:  10.1 mCi F-18 piflufolastat (Pylarify) injected intravenously.    PET imaging was obtained from skull vertex to mid thighs. Computed tomography  was used for attenuation correction and localization. Fusion imaging was  obtained.    Uptake time 52 mins.    COMPARISON:  FDG PET-CT scan 06/21/2024    HISTORY:  ORDERING SYSTEM PROVIDED HISTORY: Prostate cancer (HCC)  TECHNOLOGIST PROVIDED HISTORY:  Reason for Exam: prostate cancer    FINDINGS:  PROSTATE/PROSTATE BED: Intense PSMA activity is present in the left  peripheral zone corresponding to the area of increased activity on the prior  FDG PET-CT scan.    LYMPH NODES: No PSMA avid lymph nodes.    BONES: No focal PSMA activity within the bones.    OTHER PET FINDINGS: Physiologic activity within the salivary glands, liver,  spleen, kidneys, and bowel.    OTHER INCIDENTAL CT FINDINGS: Right-sided MediPort with the tip at the  cavoatrial junction.  Tree-in-bud nodularity in the right upper lung, similar  to the appearance on the prior PET-CT scan.  Mild atherosclerosis.  Impression: 1. Intense PSMA activity in the left peripheral zone of the prostate gland,  consistent with history of prostate cancer.  2. No PSMA avid metastatic disease.  3. Tree-in-bud nodularity in the right upper lung, similar to the appearance  on the prior PET-CT scan, likely infectious or inflammatory change.  Continued attention on follow-up.     ASSESSMENT:    Devin Arthur is a 68

## 2024-10-15 ENCOUNTER — HOSPITAL ENCOUNTER (OUTPATIENT)
Dept: PREADMISSION TESTING | Age: 69
Discharge: HOME | End: 2024-10-19
Payer: MEDICARE

## 2024-10-15 ENCOUNTER — OFFICE VISIT (OUTPATIENT)
Dept: UROLOGY | Age: 69
End: 2024-10-15
Payer: MEDICARE

## 2024-10-15 VITALS
DIASTOLIC BLOOD PRESSURE: 77 MMHG | SYSTOLIC BLOOD PRESSURE: 121 MMHG | HEIGHT: 73 IN | RESPIRATION RATE: 20 BRPM | HEART RATE: 69 BPM | BODY MASS INDEX: 20.67 KG/M2 | TEMPERATURE: 96.7 F | WEIGHT: 156 LBS | OXYGEN SATURATION: 100 %

## 2024-10-15 VITALS
OXYGEN SATURATION: 96 % | WEIGHT: 161 LBS | RESPIRATION RATE: 16 BRPM | HEIGHT: 73 IN | HEART RATE: 64 BPM | TEMPERATURE: 97.6 F | BODY MASS INDEX: 21.34 KG/M2 | DIASTOLIC BLOOD PRESSURE: 74 MMHG | SYSTOLIC BLOOD PRESSURE: 124 MMHG

## 2024-10-15 DIAGNOSIS — N40.1 BPH WITH OBSTRUCTION/LOWER URINARY TRACT SYMPTOMS: ICD-10-CM

## 2024-10-15 DIAGNOSIS — R97.20 ELEVATED PSA: ICD-10-CM

## 2024-10-15 DIAGNOSIS — N13.8 BPH WITH OBSTRUCTION/LOWER URINARY TRACT SYMPTOMS: ICD-10-CM

## 2024-10-15 DIAGNOSIS — C61 PROSTATE CANCER (HCC): Primary | ICD-10-CM

## 2024-10-15 LAB
ABO + RH BLD: NORMAL
ANION GAP SERPL CALCULATED.3IONS-SCNC: 9 MMOL/L (ref 9–16)
ARM BAND NUMBER: NORMAL
BLOOD BANK SAMPLE EXPIRATION: NORMAL
BLOOD GROUP ANTIBODIES SERPL: NEGATIVE
BUN SERPL-MCNC: 20 MG/DL (ref 8–23)
CALCIUM SERPL-MCNC: 10 MG/DL (ref 8.6–10.4)
CHLORIDE SERPL-SCNC: 101 MMOL/L (ref 98–107)
CO2 SERPL-SCNC: 22 MMOL/L (ref 20–31)
CREAT SERPL-MCNC: 1.5 MG/DL (ref 0.7–1.2)
ERYTHROCYTE [DISTWIDTH] IN BLOOD BY AUTOMATED COUNT: 12.5 % (ref 11.8–14.4)
GFR, ESTIMATED: 49 ML/MIN/1.73M2
GLUCOSE SERPL-MCNC: 92 MG/DL (ref 74–99)
HCT VFR BLD AUTO: 34.6 % (ref 40.7–50.3)
HGB BLD-MCNC: 11.7 G/DL (ref 13–17)
MCH RBC QN AUTO: 31.8 PG (ref 25.2–33.5)
MCHC RBC AUTO-ENTMCNC: 33.8 G/DL (ref 28.4–34.8)
MCV RBC AUTO: 94 FL (ref 82.6–102.9)
NRBC BLD-RTO: 0 PER 100 WBC
PLATELET # BLD AUTO: 182 K/UL (ref 138–453)
PMV BLD AUTO: 9.1 FL (ref 8.1–13.5)
POTASSIUM SERPL-SCNC: 4.8 MMOL/L (ref 3.7–5.3)
RBC # BLD AUTO: 3.68 M/UL (ref 4.21–5.77)
SODIUM SERPL-SCNC: 132 MMOL/L (ref 136–145)
WBC OTHER # BLD: 3.6 K/UL (ref 3.5–11.3)

## 2024-10-15 PROCEDURE — 86900 BLOOD TYPING SEROLOGIC ABO: CPT

## 2024-10-15 PROCEDURE — 87086 URINE CULTURE/COLONY COUNT: CPT

## 2024-10-15 PROCEDURE — 86850 RBC ANTIBODY SCREEN: CPT

## 2024-10-15 PROCEDURE — 1123F ACP DISCUSS/DSCN MKR DOCD: CPT | Performed by: NURSE PRACTITIONER

## 2024-10-15 PROCEDURE — 80048 BASIC METABOLIC PNL TOTAL CA: CPT

## 2024-10-15 PROCEDURE — 86901 BLOOD TYPING SEROLOGIC RH(D): CPT

## 2024-10-15 PROCEDURE — 36415 COLL VENOUS BLD VENIPUNCTURE: CPT

## 2024-10-15 PROCEDURE — 99214 OFFICE O/P EST MOD 30 MIN: CPT | Performed by: NURSE PRACTITIONER

## 2024-10-15 PROCEDURE — 85027 COMPLETE CBC AUTOMATED: CPT

## 2024-10-15 RX ORDER — ENOXAPARIN SODIUM 100 MG/ML
40 INJECTION SUBCUTANEOUS ONCE
OUTPATIENT
Start: 2024-10-15

## 2024-10-15 ASSESSMENT — ENCOUNTER SYMPTOMS
SHORTNESS OF BREATH: 0
NAUSEA: 0
DIARRHEA: 0
CONSTIPATION: 0
EYE REDNESS: 0
BACK PAIN: 0
COUGH: 0
WHEEZING: 0
EYE PAIN: 0
ABDOMINAL PAIN: 0
VOMITING: 0

## 2024-10-15 NOTE — PROGRESS NOTES
Anesthesia Focused Assessment  Upcoming surgery:   11/1/2024 XI ROBOTIC LAPAROSCOPIC PROSTATECTOMY, BILATERAL PELVIC LYMPH NODE DISECTION * SHORT STAY* Douglas Silva MD     History of anesthesia complications:  no, but patient reports he is very claustrophobic   Family history of anesthesia complications:  no    METS functional capacity:   -Moderate/Excellent >4  + Covid-19 test in last 8 weeks? no    STOP-BANG Sleep Apnea Questionnaire  SNORE loudly (heard through closed doors)?   No  TIRED, fatigued, sleepy during daytime?    Yes  OBSERVED stopping breathing during sleep?   No  High blood PRESSURE or being treated?    No    BMI over 35?        No  AGE over 50?        Yes  NECK circumference over 16\"?     No  GENDER (male)?       Yes             Total 3  High risk 5-8  Intermediate risk 3-4  Low risk 0-2    ----------------------------------------------------------------------------------------------------------------------  BECK:                                             no  If yes, machine?:                              Type 1 DM:                                   no  T2DM:                                           no    Coronary Artery Disease:             no  Hypertension:                               no  Defib / AICD / Pacemaker:           no    Renal Failure:                               no  If yes, on dialysis?:                           Active smoker:                              no  Drinks Alcohol:                              no  Illicit drugs:                                    no    Dentition:                                      benign     Past Medical History:   Diagnosis Date    Benign prostatic hyperplasia with lower urinary tract symptoms 07/31/2024    Cancer (HCC)     tonsilar squamous cell    Chronic sinus complaints     Claustrophobia     Depression     Dry mouth     uses Xylimelts around the clock    Elevated PSA     Generalized anxiety disorder 07/31/2024    History of chemotherapy

## 2024-10-15 NOTE — PROGRESS NOTES
[facility]  [address]  [facility] [address]  Dept: [phone]    [facility] Urology Office Note - Established    Patient:  Devin Arthur  YOB: 1955  Date: 10/15/2024    The patient is a 68 y.o. male who presents todayfor evaluation of the following problems:   Chief Complaint   Patient presents with    Preteaching        HPI  Patient is a 68-year-old male who is presenting for preteaching.  He was seen as a new patient 7/8/2024 by Dr. Silva for an elevated PSA of 17.  Prostate MRI showed a PI-RADS 5 lesion.  Subsequently underwent fusion biopsy which showed Hernando 8 disease.  PSMA PET was negative for metastatic disease.  Please scheduled for radical prostatectomy 11/1/2024.    This patient presents today for education regarding his prostate cancer treatment, post-op care and rehabilitation for incontinence and penile health. I met with Devin Arthur for 35 minutes to discuss prostate cancer treatment including basic surgery information, pre-op instructions, and post-op care: catheter care and cleaning, application of thigh and overnight bag, Kegels exercise to begin now, to stop day of surgery and then to resume after catheter removal, constipation prevention, post op impotence and possible use of vacuum erection pump, activity restrictions and post op testing and appointments. Verbal, written and visual materials presented. Patient verbalized understanding of all materials presented, and handouts given for Kegels, constipation prevention and catheter care. All questions answered. Patient encouraged to call with any further questions or problems.    Summary of old records: N/A    Additional History: N/A    Procedures Today: N/A    Urinalysis today:  No results found for this visit on 10/15/24.  Last several PSA's:  Lab Results   Component Value Date    PSA 11.10 (H) 06/07/2024     Last total testosterone:  No

## 2024-10-15 NOTE — H&P
History and Physical    Pt Name: Devin Arthur  MRN: [id number]  YOB: 1955  Date of evaluation: 10/15/2024  Primary Care Physician: Sukhdeep Dunn MD    SUBJECTIVE:   History of Chief Complaint:    Devin Arthur is a 68 y.o. male who presents for PAT appointment. Patient complains of prostate cancer. He is s/p prostate biopsy on 9/5/24. He reports complaints of urinary frequency and nocturia. Patient has been scheduled for XI ROBOTIC LAPAROSCOPIC PROSTATECTOMY, BILATERAL PELVIC LYMPH NODE DISSECTION.   Allergies  is allergic to grass pollen([id number]-swt jose roberto).  Medications  Prior to Admission medications    Medication Sig Start Date End Date Taking? Authorizing Provider   ferrous sulfate (IRON 325) 325 (65 Fe) MG tablet Take 1 tablet by mouth daily (with breakfast) 10/11/24  Yes Chin Atkinson MD   LORazepam (ATIVAN) 0.5 MG tablet Take 1 tablet by mouth every 8 hours as needed for Anxiety for up to 30 days. Max Daily Amount: 1.5 mg 10/9/24 11/8/24 Yes Brian Killian DO   traZODone (DESYREL) 50 MG tablet Take 1 tablet by mouth nightly 9/25/24 10/25/24 Yes Brian Killian DO   Pseudoephedrine HCl (SUDAFED PO) Take 30 mg by mouth as needed   Yes Santo Wheatley MD   Xylitol (XYLIMELTS MT) Take by mouth as needed   Yes Santo Wheatley MD   PSEUDOPHED-CHLOPHEDIANOL-GG PO Take by mouth   Yes Santo Wheatley MD   tamsulosin (FLOMAX) 0.4 MG capsule Take 1 capsule by mouth daily 7/8/24  Yes Douglas Silva MD   mirtazapine (REMERON) 15 MG tablet Take 1 tablet by mouth nightly   Yes Santo Wheatley MD   fluticasone (FLONASE) 50 MCG/ACT nasal spray 2 sprays daily 3/16/24 3/17/25 Yes Santo Wheatley MD   pantoprazole (PROTONIX) 20 MG tablet Take 1 tablet by mouth   Yes Santo Wheatley MD   Pseudoeph-Doxylamine-DM-APAP (NYQUIL PO) Take by mouth   Yes Santo Wheatley MD   acetaminophen (TYLENOL) 325 MG tablet Take 1 tablet by mouth every 6 hours as needed

## 2024-10-15 NOTE — H&P (VIEW-ONLY)
follows with oncology Dr. Atkinson- had recent visit last week +chronic dry mouth- uses Xylimelts continuously +reports chronic sinus complaints   RESPIRATORY:   negative for cough, shortness of breath, wheezing     CARDIOVASCULAR:   negative for chest pain, palpitations, syncope, edema     GASTROINTESTINAL:   negative for nausea, vomiting     GENITOURINARY:   negative for incontinence  +per HPI   MUSCULOSKELETAL:   negative for neck or back pain  +chronic hand joint pains   NEUROLOGICAL:   Negative for weakness and tingling  negative for headaches and dizziness     PSYCHIATRIC:   negative for anxiety       OBJECTIVE:   VITALS:  height is 1.854 m (6' 1\") and weight is 70.8 kg (156 lb). His temporal temperature is 96.7 °F (35.9 °C) (abnormal). His blood pressure is 121/77 and his pulse is 69. His respiration is 20 and oxygen saturation is 100%.   CONSTITUTIONAL:alert & oriented x 3, no acute distress. Friendly.    SKIN:  Warm and dry, no rashes on exposed areas of skin, hypopigmentation throughout   HEAD:  Normocephalic, atraumatic   EYES: EOMs intact. PERRL.  EARS:  Equal bilaterally, no edema or thickening, skin is intact without lumps or lesions. No discharge. Hearing grossly WNL.    NOSE:  Nares patent.  No rhinorrhea   MOUTH/THROAT:  Mucous membranes moist, tongue is pink, uvula midline, teeth appear to be intact  NECK:full ROM  LUNGS: Respirations even and non-labored. Clear to auscultation bilaterally, no wheezes, rales, or rhonchi.    CARDIOVASCULAR: Regular rate and rhythm, no murmurs/rubs/gallops   ABDOMEN: soft, non-tender, non-distended, bowel sounds active x 4  EXTREMITIES: No edema bilateral lower extremities.  No varicosities bilateral lower extremities.   NEUROLOGIC: CN II-XII are grossly intact.  Gait not assessed.  Testing:   EK24  Labs pending: drawn 10/15/2024  IMPRESSIONS:   Prostate cancer    PLANS:   XI ROBOTIC LAPAROSCOPIC PROSTATECTOMY, BILATERAL PELVIC LYMPH NODE DISSECTION     JACI

## 2024-10-16 ENCOUNTER — HOSPITAL ENCOUNTER (OUTPATIENT)
Dept: INFUSION THERAPY | Facility: MEDICAL CENTER | Age: 69
Discharge: HOME | End: 2024-10-16
Payer: MEDICARE

## 2024-10-16 VITALS
DIASTOLIC BLOOD PRESSURE: 75 MMHG | HEART RATE: 64 BPM | SYSTOLIC BLOOD PRESSURE: 125 MMHG | RESPIRATION RATE: 16 BRPM | TEMPERATURE: 98.1 F

## 2024-10-16 DIAGNOSIS — C09.9 TONSILLAR CANCER (HCC): Primary | ICD-10-CM

## 2024-10-16 LAB
MICROORGANISM SPEC CULT: NO GROWTH
SPECIMEN DESCRIPTION: NORMAL

## 2024-10-16 PROCEDURE — 96523 IRRIG DRUG DELIVERY DEVICE: CPT

## 2024-10-16 PROCEDURE — 6360000002 HC RX W HCPCS: Performed by: INTERNAL MEDICINE

## 2024-10-16 PROCEDURE — 2580000003 HC RX 258: Performed by: INTERNAL MEDICINE

## 2024-10-16 RX ORDER — ALBUTEROL SULFATE 90 UG/1
4 INHALANT RESPIRATORY (INHALATION) PRN
OUTPATIENT
Start: 2024-10-16

## 2024-10-16 RX ORDER — SODIUM CHLORIDE 0.9 % (FLUSH) 0.9 %
5-40 SYRINGE (ML) INJECTION PRN
OUTPATIENT
Start: 2024-10-16

## 2024-10-16 RX ORDER — SODIUM CHLORIDE 9 MG/ML
25 INJECTION, SOLUTION INTRAVENOUS PRN
OUTPATIENT
Start: 2024-10-16

## 2024-10-16 RX ORDER — EPINEPHRINE 1 MG/ML
0.3 INJECTION, SOLUTION INTRAMUSCULAR; SUBCUTANEOUS PRN
OUTPATIENT
Start: 2024-10-16

## 2024-10-16 RX ORDER — HEPARIN 100 UNIT/ML
500 SYRINGE INTRAVENOUS PRN
OUTPATIENT
Start: 2024-10-16

## 2024-10-16 RX ORDER — SODIUM CHLORIDE 9 MG/ML
INJECTION, SOLUTION INTRAVENOUS CONTINUOUS
OUTPATIENT
Start: 2024-10-16

## 2024-10-16 RX ORDER — SODIUM CHLORIDE 0.9 % (FLUSH) 0.9 %
5-40 SYRINGE (ML) INJECTION PRN
Status: DISCONTINUED | OUTPATIENT
Start: 2024-10-16 | End: 2024-10-17 | Stop reason: HOSPADM

## 2024-10-16 RX ORDER — DIPHENHYDRAMINE HYDROCHLORIDE 50 MG/ML
50 INJECTION INTRAMUSCULAR; INTRAVENOUS
OUTPATIENT
Start: 2024-10-16

## 2024-10-16 RX ORDER — HEPARIN 100 UNIT/ML
500 SYRINGE INTRAVENOUS PRN
Status: DISCONTINUED | OUTPATIENT
Start: 2024-10-16 | End: 2024-10-17 | Stop reason: HOSPADM

## 2024-10-16 RX ORDER — FAMOTIDINE 10 MG/ML
20 INJECTION, SOLUTION INTRAVENOUS
OUTPATIENT
Start: 2024-10-16

## 2024-10-16 RX ORDER — ONDANSETRON 2 MG/ML
8 INJECTION INTRAMUSCULAR; INTRAVENOUS
OUTPATIENT
Start: 2024-10-16

## 2024-10-16 RX ORDER — ACETAMINOPHEN 325 MG/1
650 TABLET ORAL
OUTPATIENT
Start: 2024-10-16

## 2024-10-16 RX ADMIN — HEPARIN 500 UNITS: 100 SYRINGE at 11:40

## 2024-10-16 RX ADMIN — SODIUM CHLORIDE, PRESERVATIVE FREE 20 ML: 5 INJECTION INTRAVENOUS at 11:39

## 2024-10-16 NOTE — PROGRESS NOTES
Patient arrive ambulatory for port flush.    Denies complaint or concern.  Port accessed without difficulty.  Port flushed and heparinized with intact jaeger needle removed per protocol.  Patient discharged.

## 2024-10-18 ENCOUNTER — TELEPHONE (OUTPATIENT)
Dept: INFUSION THERAPY | Age: 69
End: 2024-10-18

## 2024-10-26 DIAGNOSIS — G47.01 INSOMNIA DUE TO MEDICAL CONDITION: ICD-10-CM

## 2024-10-28 DIAGNOSIS — F41.1 GENERALIZED ANXIETY DISORDER: Primary | ICD-10-CM

## 2024-10-28 DIAGNOSIS — G47.01 INSOMNIA DUE TO MEDICAL CONDITION: ICD-10-CM

## 2024-10-28 DIAGNOSIS — F33.9 EPISODE OF RECURRENT MAJOR DEPRESSIVE DISORDER, UNSPECIFIED DEPRESSION EPISODE SEVERITY (HCC): ICD-10-CM

## 2024-10-28 DIAGNOSIS — F41.1 GENERALIZED ANXIETY DISORDER: ICD-10-CM

## 2024-10-28 RX ORDER — LORAZEPAM 0.5 MG/1
0.5 TABLET ORAL EVERY 8 HOURS PRN
Qty: 90 TABLET | Refills: 0 | Status: SHIPPED | OUTPATIENT
Start: 2024-11-07 | End: 2024-12-07

## 2024-10-28 RX ORDER — TRAZODONE HYDROCHLORIDE 50 MG/1
50 TABLET, FILM COATED ORAL NIGHTLY
Qty: 30 TABLET | Refills: 0 | Status: SHIPPED | OUTPATIENT
Start: 2024-10-28 | End: 2024-11-27

## 2024-10-28 RX ORDER — LORAZEPAM 0.5 MG/1
0.5 TABLET ORAL EVERY 8 HOURS PRN
Qty: 90 TABLET | Refills: 0 | OUTPATIENT
Start: 2024-10-28 | End: 2024-11-27

## 2024-11-01 ENCOUNTER — ANESTHESIA EVENT (OUTPATIENT)
Dept: OPERATING ROOM | Age: 69
End: 2024-11-01
Payer: MEDICARE

## 2024-11-01 ENCOUNTER — HOSPITAL ENCOUNTER (OUTPATIENT)
Age: 69
Setting detail: OBSERVATION
Discharge: HOME | End: 2024-11-03
Attending: UROLOGY | Admitting: UROLOGY
Payer: MEDICARE

## 2024-11-01 ENCOUNTER — ANESTHESIA (OUTPATIENT)
Dept: OPERATING ROOM | Age: 69
End: 2024-11-01
Payer: MEDICARE

## 2024-11-01 DIAGNOSIS — C61 PROSTATE CANCER (HCC): ICD-10-CM

## 2024-11-01 LAB
ANION GAP SERPL CALCULATED.3IONS-SCNC: 8 MMOL/L (ref 9–16)
BUN SERPL-MCNC: 17 MG/DL (ref 8–23)
CALCIUM SERPL-MCNC: 9.2 MG/DL (ref 8.6–10.4)
CHLORIDE SERPL-SCNC: 105 MMOL/L (ref 98–107)
CO2 SERPL-SCNC: 22 MMOL/L (ref 20–31)
CREAT SERPL-MCNC: 1.6 MG/DL (ref 0.7–1.2)
ERYTHROCYTE [DISTWIDTH] IN BLOOD BY AUTOMATED COUNT: 12.4 % (ref 11.8–14.4)
GFR, ESTIMATED: 46 ML/MIN/1.73M2
GLUCOSE SERPL-MCNC: 206 MG/DL (ref 74–99)
HCT VFR BLD AUTO: 31.1 % (ref 40.7–50.3)
HGB BLD-MCNC: 10.6 G/DL (ref 13–17)
MCH RBC QN AUTO: 31.2 PG (ref 25.2–33.5)
MCHC RBC AUTO-ENTMCNC: 34.1 G/DL (ref 28.4–34.8)
MCV RBC AUTO: 91.5 FL (ref 82.6–102.9)
NRBC BLD-RTO: 0 PER 100 WBC
PLATELET # BLD AUTO: 186 K/UL (ref 138–453)
PMV BLD AUTO: 9 FL (ref 8.1–13.5)
POTASSIUM SERPL-SCNC: 3.9 MMOL/L (ref 3.7–5.3)
RBC # BLD AUTO: 3.4 M/UL (ref 4.21–5.77)
SODIUM SERPL-SCNC: 135 MMOL/L (ref 136–145)
WBC OTHER # BLD: 9.6 K/UL (ref 3.5–11.3)

## 2024-11-01 PROCEDURE — 88307 TISSUE EXAM BY PATHOLOGIST: CPT

## 2024-11-01 PROCEDURE — 6370000000 HC RX 637 (ALT 250 FOR IP): Performed by: STUDENT IN AN ORGANIZED HEALTH CARE EDUCATION/TRAINING PROGRAM

## 2024-11-01 PROCEDURE — 3700000000 HC ANESTHESIA ATTENDED CARE: Performed by: UROLOGY

## 2024-11-01 PROCEDURE — 3600000019 HC SURGERY ROBOT ADDTL 15MIN: Performed by: UROLOGY

## 2024-11-01 PROCEDURE — 2580000003 HC RX 258: Performed by: ANESTHESIOLOGY

## 2024-11-01 PROCEDURE — 2580000003 HC RX 258: Performed by: UROLOGY

## 2024-11-01 PROCEDURE — 2580000003 HC RX 258: Performed by: STUDENT IN AN ORGANIZED HEALTH CARE EDUCATION/TRAINING PROGRAM

## 2024-11-01 PROCEDURE — 6360000002 HC RX W HCPCS: Performed by: STUDENT IN AN ORGANIZED HEALTH CARE EDUCATION/TRAINING PROGRAM

## 2024-11-01 PROCEDURE — 2580000003 HC RX 258

## 2024-11-01 PROCEDURE — 2709999900 HC NON-CHARGEABLE SUPPLY: Performed by: UROLOGY

## 2024-11-01 PROCEDURE — 6370000000 HC RX 637 (ALT 250 FOR IP): Performed by: UROLOGY

## 2024-11-01 PROCEDURE — 85027 COMPLETE CBC AUTOMATED: CPT

## 2024-11-01 PROCEDURE — 96372 THER/PROPH/DIAG INJ SC/IM: CPT

## 2024-11-01 PROCEDURE — 7100000000 HC PACU RECOVERY - FIRST 15 MIN: Performed by: UROLOGY

## 2024-11-01 PROCEDURE — S2900 ROBOTIC SURGICAL SYSTEM: HCPCS | Performed by: UROLOGY

## 2024-11-01 PROCEDURE — 87086 URINE CULTURE/COLONY COUNT: CPT

## 2024-11-01 PROCEDURE — 88305 TISSUE EXAM BY PATHOLOGIST: CPT

## 2024-11-01 PROCEDURE — 2500000003 HC RX 250 WO HCPCS

## 2024-11-01 PROCEDURE — 7100000001 HC PACU RECOVERY - ADDTL 15 MIN: Performed by: UROLOGY

## 2024-11-01 PROCEDURE — 2500000003 HC RX 250 WO HCPCS: Performed by: UROLOGY

## 2024-11-01 PROCEDURE — 6360000002 HC RX W HCPCS

## 2024-11-01 PROCEDURE — 3600000009 HC SURGERY ROBOT BASE: Performed by: UROLOGY

## 2024-11-01 PROCEDURE — 88309 TISSUE EXAM BY PATHOLOGIST: CPT

## 2024-11-01 PROCEDURE — C1889 IMPLANT/INSERT DEVICE, NOC: HCPCS | Performed by: UROLOGY

## 2024-11-01 PROCEDURE — G0378 HOSPITAL OBSERVATION PER HR: HCPCS

## 2024-11-01 PROCEDURE — 6370000000 HC RX 637 (ALT 250 FOR IP)

## 2024-11-01 PROCEDURE — 3700000001 HC ADD 15 MINUTES (ANESTHESIA): Performed by: UROLOGY

## 2024-11-01 PROCEDURE — 6360000002 HC RX W HCPCS: Performed by: ANESTHESIOLOGY

## 2024-11-01 PROCEDURE — 2720000010 HC SURG SUPPLY STERILE: Performed by: UROLOGY

## 2024-11-01 PROCEDURE — 6360000002 HC RX W HCPCS: Performed by: UROLOGY

## 2024-11-01 PROCEDURE — 80048 BASIC METABOLIC PNL TOTAL CA: CPT

## 2024-11-01 DEVICE — HEMOLOK L 6 CLIPS/CART
Type: IMPLANTABLE DEVICE | Status: FUNCTIONAL
Brand: WECK

## 2024-11-01 RX ORDER — DIPHENHYDRAMINE HYDROCHLORIDE 50 MG/ML
12.5 INJECTION INTRAMUSCULAR; INTRAVENOUS
Status: COMPLETED | OUTPATIENT
Start: 2024-11-01 | End: 2024-11-01

## 2024-11-01 RX ORDER — FENTANYL CITRATE 50 UG/ML
25 INJECTION, SOLUTION INTRAMUSCULAR; INTRAVENOUS EVERY 5 MIN PRN
Status: COMPLETED | OUTPATIENT
Start: 2024-11-01 | End: 2024-11-01

## 2024-11-01 RX ORDER — SODIUM CHLORIDE, SODIUM LACTATE, POTASSIUM CHLORIDE, CALCIUM CHLORIDE 600; 310; 30; 20 MG/100ML; MG/100ML; MG/100ML; MG/100ML
INJECTION, SOLUTION INTRAVENOUS CONTINUOUS
Status: DISCONTINUED | OUTPATIENT
Start: 2024-11-01 | End: 2024-11-01 | Stop reason: HOSPADM

## 2024-11-01 RX ORDER — NALOXONE HYDROCHLORIDE 0.4 MG/ML
INJECTION, SOLUTION INTRAMUSCULAR; INTRAVENOUS; SUBCUTANEOUS PRN
Status: DISCONTINUED | OUTPATIENT
Start: 2024-11-01 | End: 2024-11-01

## 2024-11-01 RX ORDER — SODIUM CHLORIDE 0.9 % (FLUSH) 0.9 %
5-40 SYRINGE (ML) INJECTION EVERY 12 HOURS SCHEDULED
Status: DISCONTINUED | OUTPATIENT
Start: 2024-11-01 | End: 2024-11-01

## 2024-11-01 RX ORDER — DOCUSATE SODIUM 100 MG/1
100 CAPSULE, LIQUID FILLED ORAL 2 TIMES DAILY PRN
Qty: 60 CAPSULE | Refills: 0 | Status: SHIPPED | OUTPATIENT
Start: 2024-11-01 | End: 2024-11-03

## 2024-11-01 RX ORDER — SENNOSIDES A AND B 8.6 MG/1
1 TABLET, FILM COATED ORAL NIGHTLY PRN
Status: DISCONTINUED | OUTPATIENT
Start: 2024-11-01 | End: 2024-11-03 | Stop reason: HOSPADM

## 2024-11-01 RX ORDER — OXYCODONE HYDROCHLORIDE 5 MG/1
10 TABLET ORAL EVERY 6 HOURS PRN
Status: DISCONTINUED | OUTPATIENT
Start: 2024-11-01 | End: 2024-11-03 | Stop reason: HOSPADM

## 2024-11-01 RX ORDER — DOCUSATE SODIUM 100 MG/1
100 CAPSULE, LIQUID FILLED ORAL DAILY
Status: DISCONTINUED | OUTPATIENT
Start: 2024-11-01 | End: 2024-11-03 | Stop reason: HOSPADM

## 2024-11-01 RX ORDER — SODIUM CHLORIDE 0.9 % (FLUSH) 0.9 %
5-40 SYRINGE (ML) INJECTION EVERY 12 HOURS SCHEDULED
Status: DISCONTINUED | OUTPATIENT
Start: 2024-11-01 | End: 2024-11-03 | Stop reason: HOSPADM

## 2024-11-01 RX ORDER — SODIUM CHLORIDE 0.9 % (FLUSH) 0.9 %
5-40 SYRINGE (ML) INJECTION PRN
Status: DISCONTINUED | OUTPATIENT
Start: 2024-11-01 | End: 2024-11-03 | Stop reason: HOSPADM

## 2024-11-01 RX ORDER — PROPOFOL 10 MG/ML
INJECTION, EMULSION INTRAVENOUS
Status: DISCONTINUED | OUTPATIENT
Start: 2024-11-01 | End: 2024-11-01 | Stop reason: SDUPTHER

## 2024-11-01 RX ORDER — OXYCODONE HYDROCHLORIDE 5 MG/1
5 TABLET ORAL EVERY 6 HOURS PRN
Status: DISCONTINUED | OUTPATIENT
Start: 2024-11-01 | End: 2024-11-03 | Stop reason: HOSPADM

## 2024-11-01 RX ORDER — SODIUM CHLORIDE, SODIUM LACTATE, POTASSIUM CHLORIDE, CALCIUM CHLORIDE 600; 310; 30; 20 MG/100ML; MG/100ML; MG/100ML; MG/100ML
INJECTION, SOLUTION INTRAVENOUS
Status: DISCONTINUED | OUTPATIENT
Start: 2024-11-01 | End: 2024-11-01 | Stop reason: SDUPTHER

## 2024-11-01 RX ORDER — BUPIVACAINE HYDROCHLORIDE AND EPINEPHRINE 2.5; 5 MG/ML; UG/ML
INJECTION, SOLUTION INFILTRATION; PERINEURAL PRN
Status: DISCONTINUED | OUTPATIENT
Start: 2024-11-01 | End: 2024-11-01 | Stop reason: ALTCHOICE

## 2024-11-01 RX ORDER — ACETAMINOPHEN 500 MG
1000 TABLET ORAL EVERY 6 HOURS SCHEDULED
Status: DISCONTINUED | OUTPATIENT
Start: 2024-11-01 | End: 2024-11-03 | Stop reason: HOSPADM

## 2024-11-01 RX ORDER — DEXAMETHASONE SODIUM PHOSPHATE 10 MG/ML
INJECTION, SOLUTION INTRAMUSCULAR; INTRAVENOUS
Status: DISCONTINUED | OUTPATIENT
Start: 2024-11-01 | End: 2024-11-01 | Stop reason: SDUPTHER

## 2024-11-01 RX ORDER — OXYCODONE HYDROCHLORIDE 5 MG/1
10 TABLET ORAL
Status: COMPLETED | OUTPATIENT
Start: 2024-11-01 | End: 2024-11-01

## 2024-11-01 RX ORDER — OXYCODONE HYDROCHLORIDE 5 MG/1
5 TABLET ORAL EVERY 6 HOURS PRN
Qty: 12 TABLET | Refills: 0 | Status: SHIPPED | OUTPATIENT
Start: 2024-11-01 | End: 2024-11-03

## 2024-11-01 RX ORDER — ONDANSETRON 2 MG/ML
4 INJECTION INTRAMUSCULAR; INTRAVENOUS EVERY 6 HOURS PRN
Status: DISCONTINUED | OUTPATIENT
Start: 2024-11-01 | End: 2024-11-03 | Stop reason: HOSPADM

## 2024-11-01 RX ORDER — METHOCARBAMOL 750 MG/1
750 TABLET, FILM COATED ORAL 4 TIMES DAILY
Status: DISCONTINUED | OUTPATIENT
Start: 2024-11-01 | End: 2024-11-03 | Stop reason: HOSPADM

## 2024-11-01 RX ORDER — SODIUM CHLORIDE 9 MG/ML
INJECTION, SOLUTION INTRAVENOUS PRN
Status: DISCONTINUED | OUTPATIENT
Start: 2024-11-01 | End: 2024-11-03 | Stop reason: HOSPADM

## 2024-11-01 RX ORDER — DROPERIDOL 2.5 MG/ML
0.62 INJECTION, SOLUTION INTRAMUSCULAR; INTRAVENOUS
Status: COMPLETED | OUTPATIENT
Start: 2024-11-01 | End: 2024-11-01

## 2024-11-01 RX ORDER — POLYETHYLENE GLYCOL 3350 17 G/17G
17 POWDER, FOR SOLUTION ORAL DAILY PRN
Status: DISCONTINUED | OUTPATIENT
Start: 2024-11-01 | End: 2024-11-03 | Stop reason: HOSPADM

## 2024-11-01 RX ORDER — ROCURONIUM BROMIDE 10 MG/ML
INJECTION, SOLUTION INTRAVENOUS
Status: DISCONTINUED | OUTPATIENT
Start: 2024-11-01 | End: 2024-11-01 | Stop reason: SDUPTHER

## 2024-11-01 RX ORDER — TRAZODONE HYDROCHLORIDE 50 MG/1
50 TABLET, FILM COATED ORAL NIGHTLY
Status: DISCONTINUED | OUTPATIENT
Start: 2024-11-01 | End: 2024-11-01

## 2024-11-01 RX ORDER — MIRTAZAPINE 15 MG/1
15 TABLET, FILM COATED ORAL NIGHTLY
Status: DISCONTINUED | OUTPATIENT
Start: 2024-11-01 | End: 2024-11-01

## 2024-11-01 RX ORDER — LORAZEPAM 0.5 MG/1
0.5 TABLET ORAL EVERY 4 HOURS PRN
Status: DISCONTINUED | OUTPATIENT
Start: 2024-11-01 | End: 2024-11-03 | Stop reason: HOSPADM

## 2024-11-01 RX ORDER — LIDOCAINE HYDROCHLORIDE 10 MG/ML
INJECTION, SOLUTION EPIDURAL; INFILTRATION; INTRACAUDAL; PERINEURAL
Status: DISCONTINUED | OUTPATIENT
Start: 2024-11-01 | End: 2024-11-01 | Stop reason: SDUPTHER

## 2024-11-01 RX ORDER — FENTANYL CITRATE 50 UG/ML
INJECTION, SOLUTION INTRAMUSCULAR; INTRAVENOUS
Status: DISCONTINUED | OUTPATIENT
Start: 2024-11-01 | End: 2024-11-01 | Stop reason: SDUPTHER

## 2024-11-01 RX ORDER — LORAZEPAM 2 MG/ML
0.5 INJECTION INTRAMUSCULAR
Status: DISCONTINUED | OUTPATIENT
Start: 2024-11-01 | End: 2024-11-01

## 2024-11-01 RX ORDER — PHENYLEPHRINE HCL IN 0.9% NACL 1 MG/10 ML
SYRINGE (ML) INTRAVENOUS
Status: DISCONTINUED | OUTPATIENT
Start: 2024-11-01 | End: 2024-11-01 | Stop reason: SDUPTHER

## 2024-11-01 RX ORDER — ONDANSETRON 2 MG/ML
INJECTION INTRAMUSCULAR; INTRAVENOUS
Status: DISCONTINUED | OUTPATIENT
Start: 2024-11-01 | End: 2024-11-01 | Stop reason: SDUPTHER

## 2024-11-01 RX ORDER — ONDANSETRON 4 MG/1
4 TABLET, ORALLY DISINTEGRATING ORAL EVERY 8 HOURS PRN
Status: DISCONTINUED | OUTPATIENT
Start: 2024-11-01 | End: 2024-11-03 | Stop reason: HOSPADM

## 2024-11-01 RX ORDER — FLUTICASONE PROPIONATE 50 MCG
2 SPRAY, SUSPENSION (ML) NASAL DAILY
Status: DISCONTINUED | OUTPATIENT
Start: 2024-11-02 | End: 2024-11-01

## 2024-11-01 RX ORDER — MIDAZOLAM HYDROCHLORIDE 1 MG/ML
INJECTION, SOLUTION INTRAMUSCULAR; INTRAVENOUS
Status: DISCONTINUED | OUTPATIENT
Start: 2024-11-01 | End: 2024-11-01 | Stop reason: SDUPTHER

## 2024-11-01 RX ORDER — LANOLIN ALCOHOL/MO/W.PET/CERES
3 CREAM (GRAM) TOPICAL NIGHTLY PRN
Status: DISCONTINUED | OUTPATIENT
Start: 2024-11-01 | End: 2024-11-03 | Stop reason: HOSPADM

## 2024-11-01 RX ORDER — SODIUM CHLORIDE 0.9 % (FLUSH) 0.9 %
5-40 SYRINGE (ML) INJECTION PRN
Status: DISCONTINUED | OUTPATIENT
Start: 2024-11-01 | End: 2024-11-01

## 2024-11-01 RX ORDER — ULTRASOUND COUPLING MEDIUM
GEL (GRAM) TOPICAL PRN
Status: DISCONTINUED | OUTPATIENT
Start: 2024-11-01 | End: 2024-11-01 | Stop reason: HOSPADM

## 2024-11-01 RX ORDER — GLYCOPYRROLATE 0.2 MG/ML
INJECTION INTRAMUSCULAR; INTRAVENOUS
Status: DISCONTINUED | OUTPATIENT
Start: 2024-11-01 | End: 2024-11-01 | Stop reason: SDUPTHER

## 2024-11-01 RX ORDER — SODIUM CHLORIDE 9 MG/ML
INJECTION, SOLUTION INTRAVENOUS PRN
Status: DISCONTINUED | OUTPATIENT
Start: 2024-11-01 | End: 2024-11-01

## 2024-11-01 RX ORDER — LORAZEPAM 0.5 MG/1
0.5 TABLET ORAL EVERY 8 HOURS PRN
Status: DISCONTINUED | OUTPATIENT
Start: 2024-11-01 | End: 2024-11-01

## 2024-11-01 RX ORDER — CEFAZOLIN SODIUM 1 G/3ML
INJECTION, POWDER, FOR SOLUTION INTRAMUSCULAR; INTRAVENOUS
Status: DISCONTINUED | OUTPATIENT
Start: 2024-11-01 | End: 2024-11-01 | Stop reason: SDUPTHER

## 2024-11-01 RX ORDER — HYDRALAZINE HYDROCHLORIDE 20 MG/ML
10 INJECTION INTRAMUSCULAR; INTRAVENOUS
Status: DISCONTINUED | OUTPATIENT
Start: 2024-11-01 | End: 2024-11-01

## 2024-11-01 RX ORDER — TRAMADOL HYDROCHLORIDE 50 MG/1
50 TABLET ORAL
Status: DISCONTINUED | OUTPATIENT
Start: 2024-11-01 | End: 2024-11-01

## 2024-11-01 RX ORDER — DROPERIDOL 2.5 MG/ML
0.62 INJECTION, SOLUTION INTRAMUSCULAR; INTRAVENOUS
Status: DISCONTINUED | OUTPATIENT
Start: 2024-11-01 | End: 2024-11-01

## 2024-11-01 RX ORDER — LABETALOL HYDROCHLORIDE 5 MG/ML
10 INJECTION, SOLUTION INTRAVENOUS
Status: DISCONTINUED | OUTPATIENT
Start: 2024-11-01 | End: 2024-11-01

## 2024-11-01 RX ORDER — PANTOPRAZOLE SODIUM 40 MG/1
40 TABLET, DELAYED RELEASE ORAL
Status: DISCONTINUED | OUTPATIENT
Start: 2024-11-02 | End: 2024-11-01

## 2024-11-01 RX ORDER — ENOXAPARIN SODIUM 100 MG/ML
40 INJECTION SUBCUTANEOUS ONCE
Status: COMPLETED | OUTPATIENT
Start: 2024-11-01 | End: 2024-11-01

## 2024-11-01 RX ORDER — SODIUM CHLORIDE 9 MG/ML
INJECTION, SOLUTION INTRAVENOUS CONTINUOUS
Status: DISCONTINUED | OUTPATIENT
Start: 2024-11-01 | End: 2024-11-03 | Stop reason: HOSPADM

## 2024-11-01 RX ORDER — SULFAMETHOXAZOLE AND TRIMETHOPRIM 800; 160 MG/1; MG/1
1 TABLET ORAL 2 TIMES DAILY
Qty: 6 TABLET | Refills: 0 | Status: SHIPPED | OUTPATIENT
Start: 2024-11-01 | End: 2024-11-03

## 2024-11-01 RX ADMIN — SUGAMMADEX 200 MG: 100 INJECTION, SOLUTION INTRAVENOUS at 10:52

## 2024-11-01 RX ADMIN — FENTANYL CITRATE 25 MCG: 50 INJECTION, SOLUTION INTRAMUSCULAR; INTRAVENOUS at 11:49

## 2024-11-01 RX ADMIN — HYDROMORPHONE HYDROCHLORIDE 0.25 MG: 1 INJECTION, SOLUTION INTRAMUSCULAR; INTRAVENOUS; SUBCUTANEOUS at 20:27

## 2024-11-01 RX ADMIN — LORAZEPAM 0.5 MG: 0.5 TABLET ORAL at 18:42

## 2024-11-01 RX ADMIN — CEFAZOLIN 2 G: 1 INJECTION, POWDER, FOR SOLUTION INTRAMUSCULAR; INTRAVENOUS at 08:05

## 2024-11-01 RX ADMIN — DEXAMETHASONE SODIUM PHOSPHATE 10 MG: 10 INJECTION, SOLUTION INTRAMUSCULAR; INTRAVENOUS at 08:04

## 2024-11-01 RX ADMIN — ONDANSETRON 4 MG: 2 INJECTION INTRAMUSCULAR; INTRAVENOUS at 10:11

## 2024-11-01 RX ADMIN — ENOXAPARIN SODIUM 40 MG: 100 INJECTION SUBCUTANEOUS at 06:37

## 2024-11-01 RX ADMIN — FENTANYL CITRATE 50 MCG: 50 INJECTION, SOLUTION INTRAMUSCULAR; INTRAVENOUS at 09:58

## 2024-11-01 RX ADMIN — FENTANYL CITRATE 25 MCG: 50 INJECTION, SOLUTION INTRAMUSCULAR; INTRAVENOUS at 10:59

## 2024-11-01 RX ADMIN — MIDAZOLAM 2 MG: 1 INJECTION INTRAMUSCULAR; INTRAVENOUS at 07:37

## 2024-11-01 RX ADMIN — OXYCODONE 10 MG: 5 TABLET ORAL at 12:21

## 2024-11-01 RX ADMIN — DIPHENHYDRAMINE HYDROCHLORIDE 12.5 MG: 50 INJECTION INTRAMUSCULAR; INTRAVENOUS at 12:10

## 2024-11-01 RX ADMIN — DOCUSATE SODIUM 100 MG: 100 CAPSULE, LIQUID FILLED ORAL at 17:11

## 2024-11-01 RX ADMIN — FENTANYL CITRATE 25 MCG: 50 INJECTION, SOLUTION INTRAMUSCULAR; INTRAVENOUS at 08:49

## 2024-11-01 RX ADMIN — ROCURONIUM BROMIDE 10 MG: 10 INJECTION, SOLUTION INTRAVENOUS at 08:41

## 2024-11-01 RX ADMIN — ROCURONIUM BROMIDE 10 MG: 10 INJECTION, SOLUTION INTRAVENOUS at 09:54

## 2024-11-01 RX ADMIN — Medication 50 MCG: at 07:51

## 2024-11-01 RX ADMIN — ACETAMINOPHEN 1000 MG: 500 TABLET ORAL at 17:11

## 2024-11-01 RX ADMIN — DROPERIDOL 0.62 MG: 2.5 INJECTION, SOLUTION INTRAMUSCULAR; INTRAVENOUS at 11:59

## 2024-11-01 RX ADMIN — HYOSCYAMINE SULFATE 0.12 MG: 0.12 TABLET SUBLINGUAL at 17:11

## 2024-11-01 RX ADMIN — HYDROMORPHONE HYDROCHLORIDE 0.5 MG: 1 INJECTION, SOLUTION INTRAMUSCULAR; INTRAVENOUS; SUBCUTANEOUS at 11:26

## 2024-11-01 RX ADMIN — METHOCARBAMOL 750 MG: 750 TABLET ORAL at 20:18

## 2024-11-01 RX ADMIN — METHOCARBAMOL 750 MG: 750 TABLET ORAL at 17:11

## 2024-11-01 RX ADMIN — OXYCODONE 10 MG: 5 TABLET ORAL at 18:42

## 2024-11-01 RX ADMIN — GLYCOPYRROLATE 0.2 MG: 0.2 INJECTION INTRAMUSCULAR; INTRAVENOUS at 07:45

## 2024-11-01 RX ADMIN — SODIUM CHLORIDE, PRESERVATIVE FREE 10 ML: 5 INJECTION INTRAVENOUS at 20:18

## 2024-11-01 RX ADMIN — LIDOCAINE HYDROCHLORIDE 50 MG: 10 INJECTION, SOLUTION EPIDURAL; INFILTRATION; INTRACAUDAL; PERINEURAL at 07:40

## 2024-11-01 RX ADMIN — ROCURONIUM BROMIDE 20 MG: 10 INJECTION, SOLUTION INTRAVENOUS at 08:05

## 2024-11-01 RX ADMIN — HYDROMORPHONE HYDROCHLORIDE 0.5 MG: 1 INJECTION, SOLUTION INTRAMUSCULAR; INTRAVENOUS; SUBCUTANEOUS at 11:33

## 2024-11-01 RX ADMIN — SODIUM CHLORIDE, POTASSIUM CHLORIDE, SODIUM LACTATE AND CALCIUM CHLORIDE: 600; 310; 30; 20 INJECTION, SOLUTION INTRAVENOUS at 07:38

## 2024-11-01 RX ADMIN — SODIUM CHLORIDE: 9 INJECTION, SOLUTION INTRAVENOUS at 17:16

## 2024-11-01 RX ADMIN — ROCURONIUM BROMIDE 50 MG: 10 INJECTION, SOLUTION INTRAVENOUS at 07:40

## 2024-11-01 RX ADMIN — HYOSCYAMINE SULFATE 0.12 MG: 0.12 TABLET SUBLINGUAL at 20:25

## 2024-11-01 RX ADMIN — FENTANYL CITRATE 100 MCG: 50 INJECTION, SOLUTION INTRAMUSCULAR; INTRAVENOUS at 07:40

## 2024-11-01 RX ADMIN — SODIUM CHLORIDE, POTASSIUM CHLORIDE, SODIUM LACTATE AND CALCIUM CHLORIDE: 600; 310; 30; 20 INJECTION, SOLUTION INTRAVENOUS at 06:39

## 2024-11-01 RX ADMIN — FENTANYL CITRATE 25 MCG: 50 INJECTION, SOLUTION INTRAMUSCULAR; INTRAVENOUS at 11:44

## 2024-11-01 RX ADMIN — ROCURONIUM BROMIDE 10 MG: 10 INJECTION, SOLUTION INTRAVENOUS at 09:27

## 2024-11-01 RX ADMIN — FENTANYL CITRATE 25 MCG: 50 INJECTION, SOLUTION INTRAMUSCULAR; INTRAVENOUS at 12:14

## 2024-11-01 RX ADMIN — FENTANYL CITRATE 25 MCG: 50 INJECTION, SOLUTION INTRAMUSCULAR; INTRAVENOUS at 12:07

## 2024-11-01 RX ADMIN — Medication 50 MCG: at 08:01

## 2024-11-01 RX ADMIN — PROPOFOL 150 MG: 10 INJECTION, EMULSION INTRAVENOUS at 07:40

## 2024-11-01 RX ADMIN — HYOSCYAMINE SULFATE 0.12 MG: 0.12 TABLET SUBLINGUAL at 12:23

## 2024-11-01 ASSESSMENT — PAIN SCALES - WONG BAKER
WONGBAKER_NUMERICALRESPONSE: HURTS WHOLE LOT

## 2024-11-01 ASSESSMENT — PAIN DESCRIPTION - ONSET
ONSET: AWAKENED FROM SLEEP
ONSET: AWAKENED FROM SLEEP

## 2024-11-01 ASSESSMENT — PAIN DESCRIPTION - DESCRIPTORS
DESCRIPTORS: PRESSURE
DESCRIPTORS: ACHING;THROBBING
DESCRIPTORS: PRESSURE

## 2024-11-01 ASSESSMENT — PAIN SCALES - GENERAL
PAINLEVEL_OUTOF10: 4
PAINLEVEL_OUTOF10: 10
PAINLEVEL_OUTOF10: 8
PAINLEVEL_OUTOF10: 8
PAINLEVEL_OUTOF10: 4
PAINLEVEL_OUTOF10: 6
PAINLEVEL_OUTOF10: 6
PAINLEVEL_OUTOF10: 8
PAINLEVEL_OUTOF10: 7

## 2024-11-01 ASSESSMENT — PAIN DESCRIPTION - LOCATION
LOCATION: ABDOMEN;SHOULDER
LOCATION: ABDOMEN
LOCATION: ABDOMEN
LOCATION: ABDOMEN;PERINEUM;PENIS

## 2024-11-01 ASSESSMENT — PAIN - FUNCTIONAL ASSESSMENT
PAIN_FUNCTIONAL_ASSESSMENT: NONE - DENIES PAIN
PAIN_FUNCTIONAL_ASSESSMENT: ACTIVITIES ARE NOT PREVENTED

## 2024-11-01 ASSESSMENT — PAIN DESCRIPTION - ORIENTATION: ORIENTATION: MID;LEFT

## 2024-11-01 NOTE — ANESTHESIA PRE PROCEDURE
Department of Anesthesiology  Preprocedure Note       Name:  Devin Arthur   Age:  68 y.o.  :  1955                                          MRN:  [id number]         Date:  2024      Surgeon: Surgeon(s):  Douglas Silva MD    Procedure: Procedure(s):  XI ROBOTIC LAPAROSCOPIC PROSTATECTOMY, BILATERAL PELVIC LYMPH NODE DISECTION  *SHORT STAY*    Medications prior to admission:   Prior to Admission medications    Medication Sig Start Date End Date Taking? Authorizing Provider   traZODone (DESYREL) 50 MG tablet Take 1 tablet by mouth nightly 10/28/24 11/27/24 Yes Brian Killian DO   LORazepam (ATIVAN) 0.5 MG tablet Take 1 tablet by mouth every 8 hours as needed for Anxiety for up to 30 days. Max Daily Amount: 1.5 mg 24 Yes Brian Killian DO   Pseudoephedrine HCl (SUDAFED PO) Take 30 mg by mouth as needed   Yes ProviderSanto MD   Xylitol (XYLIMELTS MT) Take by mouth as needed   Yes Provider, MD Santo   PSEUDOPHED-CHLOPHEDIANOL-GG PO Take by mouth   Yes Provider, MD Santo   mirtazapine (REMERON) 15 MG tablet Take 1 tablet by mouth nightly   Yes ProviderSanto MD   acetaminophen (TYLENOL) 325 MG tablet Take 1 tablet by mouth every 6 hours as needed   Yes Provider, MD Santo   fluticasone (FLONASE) 50 MCG/ACT nasal spray 2 sprays daily 3/16/24 3/17/25 Yes ProviderSanto MD   ibuprofen (ADVIL;MOTRIN) 200 MG tablet Take 1 tablet by mouth every 6 hours as needed   Yes Provider, MD Santo   pantoprazole (PROTONIX) 20 MG tablet Take 1 tablet by mouth   Yes ProviderSanto MD   diphenhydrAMINE (BENADRYL) 25 MG capsule Take 1 capsule by mouth every 6 hours as needed for Itching   Yes ProviderSanto MD   Pseudoeph-Doxylamine-DM-APAP (NYQUIL PO) Take by mouth   Yes ProviderSanto MD   DICLOFENAC PO Take by mouth as needed   Yes Santo Wheatley MD   ferrous sulfate (IRON 325) 325 (65 Fe) MG tablet Take 1 tablet by mouth daily

## 2024-11-01 NOTE — INTERVAL H&P NOTE
Update History & Physical    The patient's History and Physical of October 15, 2024 was reviewed with the patient and I examined the patient. There was no change. The surgical site was confirmed by the patient and me.     Plan: The risks, benefits, expected outcome, and alternative to the recommended procedure have been discussed with the patient. Patient understands and wants to proceed with the procedure. OR for RALP, bilateral PLND.    Boubacar Sanchez MD  Urology Resident, PGY-4      Electronically signed by Boubacar Sanchez MD on 11/1/2024 at 6:00 AM

## 2024-11-01 NOTE — ANESTHESIA POSTPROCEDURE EVALUATION
Department of Anesthesiology  Postprocedure Note    Patient: Devin Arthur  MRN: [id number]  YOB: 1955  Date of evaluation: 11/1/2024    Procedure Summary       Date: 11/01/24 Room / Location: [address]    Anesthesia Start: 0730 Anesthesia Stop: 1104    Procedure: XI ROBOTIC LAPAROSCOPIC PROSTATECTOMY, BILATERAL PELVIC LYMPH NODE DISECTION Diagnosis:       Prostate cancer (HCC)      (Prostate cancer (HCC) [C61])    Surgeons: Douglas Silva MD Responsible Provider: Venu Parker MD    Anesthesia Type: general ASA Status: 3            Anesthesia Type: No value filed.    Romina Phase I: Romina Score: 8    Romina Phase II:      Anesthesia Post Evaluation    Patient location during evaluation: PACU  Patient participation: complete - patient participated  Level of consciousness: awake and alert  Airway patency: patent  Nausea & Vomiting: no nausea and no vomiting  Cardiovascular status: blood pressure returned to baseline  Respiratory status: acceptable  Hydration status: euvolemic  Pain management: adequate    No notable events documented.

## 2024-11-01 NOTE — OP NOTE
Dr. Douglas Silva      FACILITY: [place], [place]    DATE OF OPERATION/PROCEDURE: 11/1/24     PREOPERATIVE DIAGNOSIS:  Adenocarcinoma of the prostate.    POSTOPERATIVE DIAGNOSIS:  Adenocarcinoma of the prostate.    PROCEDURE:  [place] robotic-assisted laparoscopic radical prostatectomy and bilateral pelvic lymph node dissection.    SURGEON:  Douglas Silva MD    ASSISTANTS: Rena Ahn MD PGY5    ANESTHESIA:  General.    IVF:see anesthesia for record, crystalloid     EBL:minimal     DRAINS:none    COMPLICATIONS:  None.    INDICATIONS:   68 y.o.  male with left sided disease as noted below and prostate volume of appx 70g. Patient is here for a robotic assisted prostatectomy and bilateral lymph node dissection. Patient has poor baseline sexual function and would like to optimize oncologic control. No nerve dissection due to this and poor pathologic vitaliy score with concern fo EPE/PNI. The risks, benefits, and alternatives were explained, and informed consent was signed and he elected to proceed with the aforementioned procedure.    Biopsy path:   A.  PROSTATE, LB, NEEDLE CORE BIOPSY:  Benign prostatic tissue and  seminal vesicle.    B.  PROSTATE, LLB, NEEDLE CORE BIOPSY:  Benign prostatic tissue.    C.  PROSTATE, LM, NEEDLE CORE BIOPSY:  Prostatic adenocarcinoma,  Vitaliy score 4+3=7 (grade group 3), with 60% pattern 4, involving 40%  of total volume. See comment.    D.  PROSTATE, LLM, NEEDLE CORE BIOPSY:  Prostatic adenocarcinoma,  Vitaliy score 3+4=7 (grade group 2), with 40% pattern 4, involving 50%  of total volume. Adenocarcinoma approaches adipose tissue, suggestive  of extraprostatic extension. See comment.    E.  PROSTATE, LA, NEEDLE CORE BIOPSY:  Prostatic adenocarcinoma,  [place] score 3+3=6 (grade group 1), involving 80% of total volume.  Perineural invasion present.    F.  PROSTATE, LLA, NEEDLE CORE BIOPSY:  Prostatic adenocarcinoma,  Vitaliy score 3+4=7 (grade group

## 2024-11-01 NOTE — PROGRESS NOTES
Dr. Silva at bedside evaluating patient. Patient is now resting. RN discussed with Dr. Silva that the patient and the wife stated that he wants to be admitted do to the discomfort and pain.

## 2024-11-01 NOTE — DISCHARGE INSTRUCTIONS
General Discharge Instructions:    Pt ok to discharge home in good condition  No heavy lifting, >10 lbs for 4-6 week or till cleared by attending physician  Pt should avoid strenuous activity for 4-6 weeks  Pt should walk moderately at home  Pt ok to shower in 24 hrs after discharge while keeping incision clean / dry.  Pt may resume diet as tolerated  Pt should take Rx as directed, antibiotic bactrim day before day of and day after delgado removal   Delgado on discharge and will follow up for clinic appointment   No driving while on narcotics  Please call attending physician or hospital  with questions  Call or Present to ED if fever (> 101F), intractable nausea vomiting or pain, if incisions become red/swollen or drain pus/fluid, or if calves become red swollen and tender.  Pt should follow up with Dr. Silva in 1 week for catheter removal.     No alcoholic beverages, no driving or operating machinery, no making important decisions for 24 hours.   You may have a normal diet but should eat lightly day of surgery.  Drink plenty of fluids.  Urinate within 8 hours after surgery, if unable to urinate call your doctor

## 2024-11-02 LAB
ANION GAP SERPL CALCULATED.3IONS-SCNC: 6 MMOL/L (ref 9–16)
BUN SERPL-MCNC: 24 MG/DL (ref 8–23)
CALCIUM SERPL-MCNC: 8.7 MG/DL (ref 8.6–10.4)
CHLORIDE SERPL-SCNC: 106 MMOL/L (ref 98–107)
CO2 SERPL-SCNC: 18 MMOL/L (ref 20–31)
CREAT SERPL-MCNC: 1.9 MG/DL (ref 0.7–1.2)
ERYTHROCYTE [DISTWIDTH] IN BLOOD BY AUTOMATED COUNT: 12.6 % (ref 11.8–14.4)
ERYTHROCYTE [DISTWIDTH] IN BLOOD BY AUTOMATED COUNT: 12.7 % (ref 11.8–14.4)
ERYTHROCYTE [DISTWIDTH] IN BLOOD BY AUTOMATED COUNT: 13.2 % (ref 11.8–14.4)
GFR, ESTIMATED: 37 ML/MIN/1.73M2
GLUCOSE SERPL-MCNC: 121 MG/DL (ref 74–99)
HCT VFR BLD AUTO: 19.4 % (ref 40.7–50.3)
HCT VFR BLD AUTO: 21.2 % (ref 40.7–50.3)
HCT VFR BLD AUTO: 23 % (ref 40.7–50.3)
HGB BLD-MCNC: 6.5 G/DL (ref 13–17)
HGB BLD-MCNC: 6.7 G/DL (ref 13–17)
HGB BLD-MCNC: 8 G/DL (ref 13–17)
MCH RBC QN AUTO: 31.4 PG (ref 25.2–33.5)
MCH RBC QN AUTO: 31.8 PG (ref 25.2–33.5)
MCH RBC QN AUTO: 32 PG (ref 25.2–33.5)
MCHC RBC AUTO-ENTMCNC: 31.6 G/DL (ref 28.4–34.8)
MCHC RBC AUTO-ENTMCNC: 33.5 G/DL (ref 28.4–34.8)
MCHC RBC AUTO-ENTMCNC: 34.8 G/DL (ref 28.4–34.8)
MCV RBC AUTO: 100.5 FL (ref 82.6–102.9)
MCV RBC AUTO: 92 FL (ref 82.6–102.9)
MCV RBC AUTO: 93.7 FL (ref 82.6–102.9)
MICROORGANISM SPEC CULT: NO GROWTH
NRBC BLD-RTO: 0 PER 100 WBC
PLATELET # BLD AUTO: 113 K/UL (ref 138–453)
PLATELET # BLD AUTO: 141 K/UL (ref 138–453)
PLATELET # BLD AUTO: 143 K/UL (ref 138–453)
PMV BLD AUTO: 9.2 FL (ref 8.1–13.5)
PMV BLD AUTO: 9.3 FL (ref 8.1–13.5)
PMV BLD AUTO: 9.4 FL (ref 8.1–13.5)
POTASSIUM SERPL-SCNC: 5 MMOL/L (ref 3.7–5.3)
RBC # BLD AUTO: 2.07 M/UL (ref 4.21–5.77)
RBC # BLD AUTO: 2.11 M/UL (ref 4.21–5.77)
RBC # BLD AUTO: 2.5 M/UL (ref 4.21–5.77)
SERVICE CMNT-IMP: NORMAL
SODIUM SERPL-SCNC: 130 MMOL/L (ref 136–145)
SPECIMEN DESCRIPTION: NORMAL
WBC OTHER # BLD: 6.4 K/UL (ref 3.5–11.3)
WBC OTHER # BLD: 8.8 K/UL (ref 3.5–11.3)
WBC OTHER # BLD: 9.1 K/UL (ref 3.5–11.3)

## 2024-11-02 PROCEDURE — 2580000003 HC RX 258: Performed by: STUDENT IN AN ORGANIZED HEALTH CARE EDUCATION/TRAINING PROGRAM

## 2024-11-02 PROCEDURE — 6370000000 HC RX 637 (ALT 250 FOR IP): Performed by: STUDENT IN AN ORGANIZED HEALTH CARE EDUCATION/TRAINING PROGRAM

## 2024-11-02 PROCEDURE — 6360000002 HC RX W HCPCS: Performed by: STUDENT IN AN ORGANIZED HEALTH CARE EDUCATION/TRAINING PROGRAM

## 2024-11-02 PROCEDURE — G0378 HOSPITAL OBSERVATION PER HR: HCPCS

## 2024-11-02 PROCEDURE — P9016 RBC LEUKOCYTES REDUCED: HCPCS

## 2024-11-02 PROCEDURE — 6370000000 HC RX 637 (ALT 250 FOR IP)

## 2024-11-02 PROCEDURE — 96374 THER/PROPH/DIAG INJ IV PUSH: CPT

## 2024-11-02 PROCEDURE — 36415 COLL VENOUS BLD VENIPUNCTURE: CPT

## 2024-11-02 PROCEDURE — 85027 COMPLETE CBC AUTOMATED: CPT

## 2024-11-02 PROCEDURE — 80048 BASIC METABOLIC PNL TOTAL CA: CPT

## 2024-11-02 PROCEDURE — 36430 TRANSFUSION BLD/BLD COMPNT: CPT

## 2024-11-02 RX ORDER — TRAZODONE HYDROCHLORIDE 50 MG/1
50 TABLET, FILM COATED ORAL NIGHTLY
Status: DISCONTINUED | OUTPATIENT
Start: 2024-11-02 | End: 2024-11-03 | Stop reason: HOSPADM

## 2024-11-02 RX ORDER — SODIUM CHLORIDE 9 MG/ML
INJECTION, SOLUTION INTRAVENOUS PRN
Status: DISCONTINUED | OUTPATIENT
Start: 2024-11-02 | End: 2024-11-03 | Stop reason: HOSPADM

## 2024-11-02 RX ORDER — MIRTAZAPINE 15 MG/1
15 TABLET, FILM COATED ORAL NIGHTLY
Status: DISCONTINUED | OUTPATIENT
Start: 2024-11-02 | End: 2024-11-03 | Stop reason: HOSPADM

## 2024-11-02 RX ADMIN — METHOCARBAMOL 750 MG: 750 TABLET ORAL at 17:42

## 2024-11-02 RX ADMIN — HYOSCYAMINE SULFATE 0.12 MG: 0.12 TABLET SUBLINGUAL at 09:37

## 2024-11-02 RX ADMIN — BENZOCAINE AND MENTHOL 1 LOZENGE: 15; 3.6 LOZENGE ORAL at 10:49

## 2024-11-02 RX ADMIN — ACETAMINOPHEN 1000 MG: 500 TABLET ORAL at 17:42

## 2024-11-02 RX ADMIN — TRAZODONE HYDROCHLORIDE 50 MG: 50 TABLET ORAL at 20:30

## 2024-11-02 RX ADMIN — MIRTAZAPINE 15 MG: 15 TABLET, FILM COATED ORAL at 20:30

## 2024-11-02 RX ADMIN — METHOCARBAMOL 750 MG: 750 TABLET ORAL at 12:45

## 2024-11-02 RX ADMIN — HYOSCYAMINE SULFATE 0.12 MG: 0.12 TABLET SUBLINGUAL at 05:41

## 2024-11-02 RX ADMIN — BENZOCAINE AND MENTHOL 1 LOZENGE: 15; 3.6 LOZENGE ORAL at 12:48

## 2024-11-02 RX ADMIN — OXYCODONE 10 MG: 5 TABLET ORAL at 00:38

## 2024-11-02 RX ADMIN — ACETAMINOPHEN 1000 MG: 500 TABLET ORAL at 00:37

## 2024-11-02 RX ADMIN — ACETAMINOPHEN 1000 MG: 500 TABLET ORAL at 05:41

## 2024-11-02 RX ADMIN — METHOCARBAMOL 750 MG: 750 TABLET ORAL at 20:30

## 2024-11-02 RX ADMIN — HYOSCYAMINE SULFATE 0.12 MG: 0.12 TABLET SUBLINGUAL at 13:34

## 2024-11-02 RX ADMIN — LORAZEPAM 0.5 MG: 0.5 TABLET ORAL at 11:32

## 2024-11-02 RX ADMIN — LORAZEPAM 0.5 MG: 0.5 TABLET ORAL at 20:30

## 2024-11-02 RX ADMIN — ACETAMINOPHEN 1000 MG: 500 TABLET ORAL at 11:31

## 2024-11-02 RX ADMIN — HYOSCYAMINE SULFATE 0.12 MG: 0.12 TABLET SUBLINGUAL at 17:42

## 2024-11-02 RX ADMIN — METHOCARBAMOL 750 MG: 750 TABLET ORAL at 09:37

## 2024-11-02 RX ADMIN — HYDROMORPHONE HYDROCHLORIDE 0.25 MG: 1 INJECTION, SOLUTION INTRAMUSCULAR; INTRAVENOUS; SUBCUTANEOUS at 09:41

## 2024-11-02 RX ADMIN — OXYCODONE 10 MG: 5 TABLET ORAL at 05:41

## 2024-11-02 RX ADMIN — DOCUSATE SODIUM 100 MG: 100 CAPSULE, LIQUID FILLED ORAL at 09:37

## 2024-11-02 RX ADMIN — LORAZEPAM 0.5 MG: 0.5 TABLET ORAL at 00:38

## 2024-11-02 RX ADMIN — LORAZEPAM 0.5 MG: 0.5 TABLET ORAL at 06:49

## 2024-11-02 RX ADMIN — HYOSCYAMINE SULFATE 0.12 MG: 0.12 TABLET SUBLINGUAL at 21:51

## 2024-11-02 RX ADMIN — LORAZEPAM 0.5 MG: 0.5 TABLET ORAL at 15:50

## 2024-11-02 RX ADMIN — SODIUM CHLORIDE, PRESERVATIVE FREE 10 ML: 5 INJECTION INTRAVENOUS at 20:30

## 2024-11-02 RX ADMIN — OXYCODONE 5 MG: 5 TABLET ORAL at 11:32

## 2024-11-02 RX ADMIN — Medication 3 MG: at 00:37

## 2024-11-02 RX ADMIN — OXYCODONE 10 MG: 5 TABLET ORAL at 17:42

## 2024-11-02 ASSESSMENT — PAIN DESCRIPTION - DESCRIPTORS
DESCRIPTORS: ACHING;CRAMPING
DESCRIPTORS: ACHING

## 2024-11-02 ASSESSMENT — PAIN SCALES - GENERAL
PAINLEVEL_OUTOF10: 5
PAINLEVEL_OUTOF10: 5
PAINLEVEL_OUTOF10: 4
PAINLEVEL_OUTOF10: 10
PAINLEVEL_OUTOF10: 4
PAINLEVEL_OUTOF10: 5
PAINLEVEL_OUTOF10: 10
PAINLEVEL_OUTOF10: 5
PAINLEVEL_OUTOF10: 10
PAINLEVEL_OUTOF10: 10
PAINLEVEL_OUTOF10: 5

## 2024-11-02 ASSESSMENT — PAIN - FUNCTIONAL ASSESSMENT: PAIN_FUNCTIONAL_ASSESSMENT: ACTIVITIES ARE NOT PREVENTED

## 2024-11-02 ASSESSMENT — PAIN DESCRIPTION - LOCATION
LOCATION: ABDOMEN;SHOULDER
LOCATION: ABDOMEN;SHOULDER

## 2024-11-02 ASSESSMENT — PAIN DESCRIPTION - ORIENTATION: ORIENTATION: LEFT

## 2024-11-02 NOTE — CARE COORDINATION
Case Management Assessment  Initial Evaluation    Date/Time of Evaluation: 11/2/2024 5:22 PM  Assessment Completed by: Alyssa Bill RN    If patient is discharged prior to next notation, then this note serves as note for discharge by case management.    Patient Name: Devin Arthur                   YOB: 1955  Diagnosis: Prostate cancer (HCC) [C61]  Prostate CA (HCC) [C61]                   Date / Time: 11/1/2024  5:19 AM    Patient Admission Status: Observation   Readmission Risk (Low < 19, Mod (19-27), High > 27): Readmission Risk Score: 15.5    Current PCP: Sukhdeep Dunn MD  PCP verified by CM? Yes    Chart Reviewed: Yes      History Provided by: Patient  Patient Orientation: Alert and Oriented, Person, Place, Situation    Patient Cognition: Alert    Hospitalization in the last 30 days (Readmission):  No    If yes, Readmission Assessment in  Navigator will be completed.    Advance Directives:      Code Status: Full Code   Patient's Primary Decision Maker is: Legal Next of Kin      Discharge Planning:    Patient lives with: Spouse/Significant Other Type of Home: House  Primary Care Giver: Self  Patient Support Systems include: Spouse/Significant Other   Current Financial resources: Medicare  Current community resources:    Current services prior to admission: Durable Medical Equipment            Current DME:              Type of Home Care services:  None    ADLS  Prior functional level: Independent in ADLs/IADLs  Current functional level: Independent in ADLs/IADLs    PT AM-PAC:   /24  OT AM-PAC:   /24    Family can provide assistance at DC: Yes  Would you like Case Management to discuss the discharge plan with any other family members/significant others, and if so, who? Yes (spouse)  Plans to Return to Present Housing: Yes  Other Identified Issues/Barriers to RETURNING to current housing: na  Potential Assistance needed at discharge: N/A            Potential DME:    Patient expects to

## 2024-11-02 NOTE — PROGRESS NOTES
Ricardo Mejia Santacroce, Khan, Mostafa, Buck, Murtagh Jr  Urology Progress Note     Subjective: Status post RALP, POD1  No acute events overnight, afebrile, vitals stable  Denies fever, chills, nausea, vomiting  Pain: Moderate, mostly in right shoulder  Tolerating minimal diet, difficulty swallowing  Not passing flatus  Has not ambulated  Delgado in place draining clear yellow urine    UOP: 1.1 L since surgery  WBC: 8.8 (9.1)  Hgb: 6.7 (10.6)  Cr: 1.9 (1.6)    Additional Lab/culture results: None    Physical Exam:   Constitutional: Patient in no acute distress;   Neuro: alert and oriented to person place and time.    Psych: Mood and affect normal.  Skin: Normal  Lungs: Respiratory effort normal  Cardiovascular:  Normal peripheral pulses  Abdomen:  Soft, appropriately tender, non-distended, no peritoneal signs, incisions clean, dry and intact  : Denies flank pain, no CVA tenderness, delgdao in place    Interval Imaging Findings: None    Impression:   68 y.o. male POD #1 s/p RALP, admitted for postoperative monitoring    Plan:   -Rechecked hemoglobin due to significant drop and this was confirmed with 6.5, will transfuse 2 units of packed red blood cells, recheck hemoglobin afterward  -Regular diet  -Ambulate/out of bed  -Incentive spirometry 10 times per hour while awake  -Pain control and antiemetics as needed  -Bowel regimen  -DVT prophylaxis: SCD cuffs  -Disposition: Likely no discharge today    Boubacar Sanchez MD  Urology Resident, PGY-4

## 2024-11-02 NOTE — PLAN OF CARE
Problem: Discharge Planning  Goal: Discharge to home or other facility with appropriate resources  Outcome: Progressing  Flowsheets  Taken 11/2/2024 0606 by Asia Benites RN  Discharge to home or other facility with appropriate resources:   Identify barriers to discharge with patient and caregiver   Identify discharge learning needs (meds, wound care, etc)    Problem: Pain  Goal: Verbalizes/displays adequate comfort level or baseline comfort level  Outcome: Progressing  Flowsheets (Taken 11/2/2024 0606)  Verbalizes/displays adequate comfort level or baseline comfort level:   Encourage patient to monitor pain and request assistance   Assess pain using appropriate pain scale   Administer analgesics based on type and severity of pain and evaluate response   Implement non-pharmacological measures as appropriate and evaluate response   Consider cultural and social influences on pain and pain management   Notify Licensed Independent Practitioner if interventions unsuccessful or patient reports new pain     Problem: Safety - Adult  Goal: Free from fall injury  Outcome: Progressing  Flowsheets (Taken 11/2/2024 0606)  Free From Fall Injury: Instruct family/caregiver on patient safety     Problem: ABCDS Injury Assessment  Goal: Absence of physical injury  Outcome: Progressing  Flowsheets (Taken 11/2/2024 0606)  Absence of Physical Injury: Implement safety measures based on patient assessment

## 2024-11-03 VITALS
DIASTOLIC BLOOD PRESSURE: 76 MMHG | OXYGEN SATURATION: 95 % | TEMPERATURE: 98.5 F | RESPIRATION RATE: 16 BRPM | BODY MASS INDEX: 20.81 KG/M2 | HEART RATE: 82 BPM | HEIGHT: 73 IN | SYSTOLIC BLOOD PRESSURE: 125 MMHG | WEIGHT: 157 LBS

## 2024-11-03 LAB
ABO/RH: NORMAL
ANION GAP SERPL CALCULATED.3IONS-SCNC: 10 MMOL/L (ref 9–16)
ANTIBODY SCREEN: NEGATIVE
ARM BAND NUMBER: NORMAL
BLOOD BANK BLOOD PRODUCT EXPIRATION DATE: NORMAL
BLOOD BANK BLOOD PRODUCT EXPIRATION DATE: NORMAL
BLOOD BANK DISPENSE STATUS: NORMAL
BLOOD BANK DISPENSE STATUS: NORMAL
BLOOD BANK ISBT PRODUCT BLOOD TYPE: 6200
BLOOD BANK ISBT PRODUCT BLOOD TYPE: 6200
BLOOD BANK PRODUCT CODE: NORMAL
BLOOD BANK PRODUCT CODE: NORMAL
BLOOD BANK SAMPLE EXPIRATION: NORMAL
BLOOD BANK UNIT TYPE AND RH: NORMAL
BLOOD BANK UNIT TYPE AND RH: NORMAL
BPU ID: NORMAL
BPU ID: NORMAL
BUN SERPL-MCNC: 21 MG/DL (ref 8–23)
CALCIUM SERPL-MCNC: 8.6 MG/DL (ref 8.6–10.4)
CHLORIDE SERPL-SCNC: 107 MMOL/L (ref 98–107)
CO2 SERPL-SCNC: 18 MMOL/L (ref 20–31)
COMPONENT: NORMAL
COMPONENT: NORMAL
CREAT SERPL-MCNC: 1.5 MG/DL (ref 0.7–1.2)
CROSSMATCH RESULT: NORMAL
CROSSMATCH RESULT: NORMAL
ERYTHROCYTE [DISTWIDTH] IN BLOOD BY AUTOMATED COUNT: 13.8 % (ref 11.8–14.4)
GFR, ESTIMATED: 50 ML/MIN/1.73M2
GLUCOSE SERPL-MCNC: 90 MG/DL (ref 74–99)
HCT VFR BLD AUTO: 26.9 % (ref 40.7–50.3)
HGB BLD-MCNC: 8.7 G/DL (ref 13–17)
MCH RBC QN AUTO: 31.5 PG (ref 25.2–33.5)
MCHC RBC AUTO-ENTMCNC: 32.3 G/DL (ref 28.4–34.8)
MCV RBC AUTO: 97.5 FL (ref 82.6–102.9)
NRBC BLD-RTO: 0 PER 100 WBC
PLATELET # BLD AUTO: 120 K/UL (ref 138–453)
PMV BLD AUTO: 9.4 FL (ref 8.1–13.5)
POTASSIUM SERPL-SCNC: 4.1 MMOL/L (ref 3.7–5.3)
RBC # BLD AUTO: 2.76 M/UL (ref 4.21–5.77)
SODIUM SERPL-SCNC: 135 MMOL/L (ref 136–145)
TRANSFUSION STATUS: NORMAL
TRANSFUSION STATUS: NORMAL
UNIT DIVISION: 0
UNIT DIVISION: 0
UNIT ISSUE DATE/TIME: NORMAL
UNIT ISSUE DATE/TIME: NORMAL
WBC OTHER # BLD: 6.6 K/UL (ref 3.5–11.3)

## 2024-11-03 PROCEDURE — 6370000000 HC RX 637 (ALT 250 FOR IP): Performed by: STUDENT IN AN ORGANIZED HEALTH CARE EDUCATION/TRAINING PROGRAM

## 2024-11-03 PROCEDURE — 80048 BASIC METABOLIC PNL TOTAL CA: CPT

## 2024-11-03 PROCEDURE — 85027 COMPLETE CBC AUTOMATED: CPT

## 2024-11-03 PROCEDURE — 36415 COLL VENOUS BLD VENIPUNCTURE: CPT

## 2024-11-03 PROCEDURE — G0378 HOSPITAL OBSERVATION PER HR: HCPCS

## 2024-11-03 PROCEDURE — 2580000003 HC RX 258: Performed by: STUDENT IN AN ORGANIZED HEALTH CARE EDUCATION/TRAINING PROGRAM

## 2024-11-03 PROCEDURE — 6370000000 HC RX 637 (ALT 250 FOR IP)

## 2024-11-03 RX ORDER — SULFAMETHOXAZOLE AND TRIMETHOPRIM 800; 160 MG/1; MG/1
1 TABLET ORAL 2 TIMES DAILY
Qty: 6 TABLET | Refills: 0 | Status: SHIPPED | OUTPATIENT
Start: 2024-11-03 | End: 2024-11-06

## 2024-11-03 RX ORDER — OXYCODONE HYDROCHLORIDE 5 MG/1
5 TABLET ORAL EVERY 6 HOURS PRN
Qty: 12 TABLET | Refills: 0 | Status: SHIPPED | OUTPATIENT
Start: 2024-11-03 | End: 2024-11-06

## 2024-11-03 RX ORDER — DOCUSATE SODIUM 100 MG/1
100 CAPSULE, LIQUID FILLED ORAL 2 TIMES DAILY PRN
Qty: 60 CAPSULE | Refills: 0 | Status: SHIPPED | OUTPATIENT
Start: 2024-11-03 | End: 2024-12-03

## 2024-11-03 RX ADMIN — LORAZEPAM 0.5 MG: 0.5 TABLET ORAL at 10:15

## 2024-11-03 RX ADMIN — METHOCARBAMOL 750 MG: 750 TABLET ORAL at 08:11

## 2024-11-03 RX ADMIN — LORAZEPAM 0.5 MG: 0.5 TABLET ORAL at 01:14

## 2024-11-03 RX ADMIN — ACETAMINOPHEN 1000 MG: 500 TABLET ORAL at 05:32

## 2024-11-03 RX ADMIN — HYOSCYAMINE SULFATE 0.12 MG: 0.12 TABLET SUBLINGUAL at 06:07

## 2024-11-03 RX ADMIN — METHOCARBAMOL 750 MG: 750 TABLET ORAL at 12:12

## 2024-11-03 RX ADMIN — SODIUM CHLORIDE: 9 INJECTION, SOLUTION INTRAVENOUS at 06:08

## 2024-11-03 RX ADMIN — OXYCODONE 10 MG: 5 TABLET ORAL at 08:11

## 2024-11-03 RX ADMIN — LORAZEPAM 0.5 MG: 0.5 TABLET ORAL at 06:07

## 2024-11-03 RX ADMIN — DOCUSATE SODIUM 100 MG: 100 CAPSULE, LIQUID FILLED ORAL at 08:12

## 2024-11-03 RX ADMIN — HYOSCYAMINE SULFATE 0.12 MG: 0.12 TABLET SUBLINGUAL at 10:15

## 2024-11-03 RX ADMIN — SODIUM CHLORIDE: 9 INJECTION, SOLUTION INTRAVENOUS at 01:16

## 2024-11-03 RX ADMIN — HYOSCYAMINE SULFATE 0.12 MG: 0.12 TABLET SUBLINGUAL at 01:14

## 2024-11-03 RX ADMIN — ACETAMINOPHEN 1000 MG: 500 TABLET ORAL at 12:12

## 2024-11-03 ASSESSMENT — PAIN SCALES - GENERAL
PAINLEVEL_OUTOF10: 5
PAINLEVEL_OUTOF10: 7
PAINLEVEL_OUTOF10: 5

## 2024-11-03 NOTE — PROGRESS NOTES
Ricardo Mejia Santacroce, Khan, Mostafa, Buck, Murtagh Jr  Urology Progress Note     Subjective: Status post RALP, POD2  No acute events overnight, afebrile, vitals stable, slept better  Denies fever, chills, nausea, vomiting  Pain: Improved  Tolerating minimal diet, difficulty swallowing  Not passing flatus  Ambulated in halls  Delgado in place draining clear yellow urine    UOP: 3.4 L in 24 hours  WBC: 6.6 (8.8)  Hgb: 8.7 (8.0)  BMP in process    Additional Lab/culture results: None    Physical Exam:   Constitutional: Patient in no acute distress;   Neuro: alert and oriented to person place and time.    Psych: Mood and affect normal.  Skin: Normal  Lungs: Respiratory effort normal  Cardiovascular:  Normal peripheral pulses  Abdomen:  Soft, appropriately tender, non-distended, no peritoneal signs, incisions clean, dry and intact  : Denies flank pain, no CVA tenderness, delgado in place    Interval Imaging Findings: None    Impression:   68 y.o. male POD #2 s/p RALP, admitted for postoperative monitoring. Hemoglobin did drop on POD1, was transfused 2u pRBC, responded nicely.    Plan:   -Regular diet  -Hgb much better today, no more blood transfusion at this time  -Lower IVF to 50cc/hr  -Ambulate/out of bed  -Incentive spirometry 10 times per hour while awake  -Pain control and antiemetics as needed  -Bowel regimen  -DVT prophylaxis: SCD cuffs  -Disposition: Plan for discharge today    Boubacar Sanchez MD  Urology Resident, PGY-4

## 2024-11-03 NOTE — PROGRESS NOTES
CLINICAL PHARMACY NOTE: MEDS TO BEDS    Total # of Prescriptions Filled: 4   The following medications were delivered to the patient:  Hyoscyamine 0.125mg subl tabs  Sulfamethoxazole-trimeth 800-160mg tabs  Docusate sodium 100mg caps  Oxycodone 5mg tabs    Additional Documentation:  Pt picked up medication at [facility] pharmacy

## 2024-11-05 DIAGNOSIS — F41.1 GENERALIZED ANXIETY DISORDER: ICD-10-CM

## 2024-11-05 DIAGNOSIS — F33.9 EPISODE OF RECURRENT MAJOR DEPRESSIVE DISORDER, UNSPECIFIED DEPRESSION EPISODE SEVERITY (HCC): ICD-10-CM

## 2024-11-05 RX ORDER — LORAZEPAM 0.5 MG/1
0.5 TABLET ORAL EVERY 8 HOURS PRN
Qty: 90 TABLET | Refills: 0 | Status: SHIPPED | OUTPATIENT
Start: 2024-11-08 | End: 2024-12-08

## 2024-11-06 ENCOUNTER — HOSPITAL ENCOUNTER (OUTPATIENT)
Age: 69
Discharge: HOME | End: 2024-11-06
Payer: MEDICARE

## 2024-11-06 DIAGNOSIS — Z90.79 STATUS POST PROSTATECTOMY: Primary | ICD-10-CM

## 2024-11-06 DIAGNOSIS — C61 PROSTATE CANCER (HCC): ICD-10-CM

## 2024-11-06 DIAGNOSIS — Z90.79 STATUS POST PROSTATECTOMY: ICD-10-CM

## 2024-11-06 LAB
HCT VFR BLD AUTO: 28.2 % (ref 40.7–50.3)
HGB BLD-MCNC: 9.3 G/DL (ref 13–17)
SURGICAL PATHOLOGY REPORT: NORMAL

## 2024-11-06 PROCEDURE — 36415 COLL VENOUS BLD VENIPUNCTURE: CPT

## 2024-11-06 PROCEDURE — 85018 HEMOGLOBIN: CPT

## 2024-11-06 PROCEDURE — 85014 HEMATOCRIT: CPT

## 2024-11-07 ENCOUNTER — OFFICE VISIT (OUTPATIENT)
Dept: UROLOGY | Age: 69
End: 2024-11-07

## 2024-11-07 ENCOUNTER — HOSPITAL ENCOUNTER (OUTPATIENT)
Dept: GENERAL RADIOLOGY | Age: 69
Discharge: HOME | End: 2024-11-09
Attending: UROLOGY
Payer: MEDICARE

## 2024-11-07 VITALS — DIASTOLIC BLOOD PRESSURE: 72 MMHG | TEMPERATURE: 97.1 F | SYSTOLIC BLOOD PRESSURE: 109 MMHG | HEART RATE: 70 BPM

## 2024-11-07 DIAGNOSIS — R32 URINARY INCONTINENCE, UNSPECIFIED TYPE: ICD-10-CM

## 2024-11-07 DIAGNOSIS — C61 PROSTATE CANCER (HCC): ICD-10-CM

## 2024-11-07 DIAGNOSIS — Z90.79 HISTORY OF ROBOT-ASSISTED LAPAROSCOPIC RADICAL PROSTATECTOMY: ICD-10-CM

## 2024-11-07 DIAGNOSIS — C61 PROSTATE CANCER (HCC): Primary | ICD-10-CM

## 2024-11-07 PROCEDURE — 99024 POSTOP FOLLOW-UP VISIT: CPT | Performed by: NURSE PRACTITIONER

## 2024-11-07 PROCEDURE — 51600 INJECTION FOR BLADDER X-RAY: CPT

## 2024-11-07 PROCEDURE — 6360000004 HC RX CONTRAST MEDICATION: Performed by: UROLOGY

## 2024-11-07 RX ADMIN — DIATRIZOATE MEGLUMINE 300 ML: 300 INJECTION, SOLUTION INTRAVENOUS at 10:17

## 2024-11-07 ASSESSMENT — ENCOUNTER SYMPTOMS
EYE REDNESS: 0
NAUSEA: 0
BACK PAIN: 1
SHORTNESS OF BREATH: 0
WHEEZING: 0
VOMITING: 0
DIARRHEA: 0
ABDOMINAL PAIN: 0
CONSTIPATION: 0
EYE PAIN: 0
COUGH: 0

## 2024-11-07 NOTE — PROGRESS NOTES
[facility], [facility] UROLOGY CENTER  [address]  [facility] [place] [address]  Dept: [phone]    [facility] Urology Office Note - Established    Patient:  Devin Arthur  YOB: 1955  Date: 11/7/2024    The patient is a 68 y.o. male who presents todayfor evaluation of the following problems:   Chief Complaint   Patient presents with    Post-Op Check     Prostatectomy.  Pt states he is sore and very bruised from surgery.        HPI  This is a 67yo male with prostate cancer presenting for post-op fu.  He was dx with Talbott 8 disease on 9/5/24, pretreatment PSA 17  Underwent RALP on 11/1/24 with Dr. Silva. Final Path showed Talbott 8    -cribriform architecture present   - EPE present   - bladder neck and seminal vesicles negative.    - perineural invasion present.     - lymphovascular invasion negative.    - margins and LN negative.     Denies any post-operative fever/chills, chest pain, SOB, leg pain or swelling, or concerns of infection. He did have low hemoglobin postoperatively.  He was admitted and received a couple units of blood.  He repeated his hemoglobin yesterday which had improved from hospital discharge.  Currently 9.3. He feels tired overall, has follow-up with hematology oncology next week.  His biggest complaint is postoperative pain.  He is using oxycodone 1-2 times a day.  He is not rotating this medication with any analgesic.  Pain occurs daily, moderate to severe.  Sudden onset (incisional primarily).  The pain medication does help but he is trying to avoid it due to constipation.  He is taking stool softeners daily, he did have a bowel movement yesterday.  Ulrich was removed after cystogram today.  He is wearing protective undergarments.  He is completing antibiotics as prescribed.     Summary of old records: N/A    Additional History: N/A    Procedures Today: N/A    Urinalysis today:  No results found for this visit on

## 2024-11-07 NOTE — PROGRESS NOTES
Review of Systems   Constitutional:  Negative for chills, fatigue and fever.   Eyes:  Negative for pain, redness and visual disturbance.   Respiratory:  Negative for cough, shortness of breath and wheezing.    Cardiovascular:  Negative for chest pain and leg swelling.   Gastrointestinal:  Negative for abdominal pain, constipation, diarrhea, nausea and vomiting.   Genitourinary:  Positive for penile swelling. Negative for difficulty urinating, dysuria, flank pain, frequency, hematuria, scrotal swelling, testicular pain and urgency.        Pelvic pain   Musculoskeletal:  Positive for back pain (bruising). Negative for joint swelling and myalgias.   Skin:  Negative for rash and wound.   Neurological:  Negative for dizziness, tremors and numbness.   Hematological:  Does not bruise/bleed easily.

## 2024-11-08 DIAGNOSIS — G89.18 ACUTE POST-OPERATIVE PAIN: Primary | ICD-10-CM

## 2024-11-08 NOTE — TELEPHONE ENCOUNTER
Patient called in Friday, states that he forgot to ask for a refill on pain medication post operatively. He states that he is almost out of medication. He will have enough to get through weekend, but has still been experiencing a significant amount of pain. Patient states that he takes them sparingly due to constipation. Patient states that he is only allowed to take Tylenol and no NSAIDS. Patient states that Tylenol hasn't been helping, however he is still taking it.  He is requesting a refill just to be on the safe side in case it is needed.  Yuval Ventura will be back in the office on Monday. Will discuss with her. Patient voiced understanding.

## 2024-11-11 RX ORDER — OXYCODONE HYDROCHLORIDE 5 MG/1
5 TABLET ORAL EVERY 6 HOURS PRN
Qty: 12 TABLET | Refills: 0 | Status: SHIPPED | OUTPATIENT
Start: 2024-11-11 | End: 2024-11-15

## 2024-11-11 NOTE — TELEPHONE ENCOUNTER
Refill was sent with 12 pills. Would like him to reserve only for severe pain, needs to wean off this week.

## 2024-11-13 ENCOUNTER — HOSPITAL ENCOUNTER (OUTPATIENT)
Age: 69
Discharge: HOME | End: 2024-11-13
Payer: MEDICARE

## 2024-11-13 ENCOUNTER — HOSPITAL ENCOUNTER (OUTPATIENT)
Dept: PHYSICAL THERAPY | Facility: CLINIC | Age: 69
Setting detail: THERAPIES SERIES
Discharge: HOME | End: 2024-11-13

## 2024-11-13 ENCOUNTER — TELEPHONE (OUTPATIENT)
Dept: UROLOGY | Age: 69
End: 2024-11-13

## 2024-11-13 DIAGNOSIS — R53.83 FATIGUE, UNSPECIFIED TYPE: ICD-10-CM

## 2024-11-13 DIAGNOSIS — C09.9 TONSILLAR CANCER (HCC): ICD-10-CM

## 2024-11-13 LAB
ALBUMIN SERPL-MCNC: 4.1 G/DL (ref 3.5–5.2)
ALBUMIN/GLOB SERPL: 1.7 {RATIO} (ref 1–2.5)
ALP SERPL-CCNC: 144 U/L (ref 40–129)
ALT SERPL-CCNC: 32 U/L (ref 10–50)
ANION GAP SERPL CALCULATED.3IONS-SCNC: 12 MMOL/L (ref 9–16)
AST SERPL-CCNC: 33 U/L (ref 10–50)
BASOPHILS # BLD: 0 K/UL (ref 0–0.2)
BASOPHILS NFR BLD: 0 %
BILIRUB SERPL-MCNC: 0.7 MG/DL (ref 0–1.2)
BUN SERPL-MCNC: 28 MG/DL (ref 8–23)
CALCIUM SERPL-MCNC: 10.1 MG/DL (ref 8.8–10.2)
CHLORIDE SERPL-SCNC: 101 MMOL/L (ref 98–107)
CO2 SERPL-SCNC: 22 MMOL/L (ref 20–31)
CREAT SERPL-MCNC: 1.6 MG/DL (ref 0.7–1.2)
EOSINOPHIL # BLD: 0.07 K/UL (ref 0–0.4)
EOSINOPHILS RELATIVE PERCENT: 1 % (ref 1–4)
ERYTHROCYTE [DISTWIDTH] IN BLOOD BY AUTOMATED COUNT: 12.9 % (ref 11.8–14.4)
GFR, ESTIMATED: 46 ML/MIN/1.73M2
GLUCOSE SERPL-MCNC: 99 MG/DL (ref 82–115)
HCT VFR BLD AUTO: 29.9 % (ref 40.7–50.3)
HGB BLD-MCNC: 10.2 G/DL (ref 13–17)
IMM GRANULOCYTES # BLD AUTO: 0 K/UL (ref 0–0.3)
IMM GRANULOCYTES NFR BLD: 0 %
LYMPHOCYTES NFR BLD: 0.46 K/UL (ref 1–4.8)
LYMPHOCYTES RELATIVE PERCENT: 7 % (ref 24–44)
MCH RBC QN AUTO: 32.4 PG (ref 25.2–33.5)
MCHC RBC AUTO-ENTMCNC: 34.1 G/DL (ref 28.4–34.8)
MCV RBC AUTO: 94.9 FL (ref 82.6–102.9)
MONOCYTES NFR BLD: 0.39 K/UL (ref 0.2–0.8)
MONOCYTES NFR BLD: 6 % (ref 1–7)
NEUTROPHILS NFR BLD: 86 % (ref 36–66)
NEUTS SEG NFR BLD: 5.58 K/UL (ref 1.8–7.7)
NRBC BLD-RTO: 0 PER 100 WBC
PLATELET # BLD AUTO: 347 K/UL (ref 138–453)
PMV BLD AUTO: 8.7 FL (ref 8.1–13.5)
POTASSIUM SERPL-SCNC: 4.3 MMOL/L (ref 3.7–5.3)
PROT SERPL-MCNC: 6.6 G/DL (ref 6.6–8.7)
RBC # BLD AUTO: 3.15 M/UL (ref 4.21–5.77)
SODIUM SERPL-SCNC: 135 MMOL/L (ref 136–145)
TSH SERPL DL<=0.05 MIU/L-ACNC: 6.61 UIU/ML (ref 0.27–4.2)
WBC OTHER # BLD: 6.5 K/UL (ref 3.5–11.3)

## 2024-11-13 PROCEDURE — 84439 ASSAY OF FREE THYROXINE: CPT

## 2024-11-13 PROCEDURE — 84443 ASSAY THYROID STIM HORMONE: CPT

## 2024-11-13 PROCEDURE — 85025 COMPLETE CBC W/AUTO DIFF WBC: CPT

## 2024-11-13 PROCEDURE — 80053 COMPREHEN METABOLIC PANEL: CPT

## 2024-11-13 PROCEDURE — 36415 COLL VENOUS BLD VENIPUNCTURE: CPT

## 2024-11-13 NOTE — FLOWSHEET NOTE
[] [facility]  Outpatient Rehabilitation &  Therapy  [address]  [phone]  [phone] [] [facility]  Outpatient Rehabilitation &  Therapy  [address]  P: [phone]  F: [phone] [x] [facility]  Outpatient Rehabilitation &  Therapy  [address]  P: [phone]  F: [phone] [] [facility]  Outpatient Rehabilitation &  Therapy  [address]  [phone]  [phone] [] [facility]  Outpatient Rehabilitation &  Therapy  [address]   P: [phone]  F: [phone]  [] [facility]  Outpatient Rehabilitation &  Therapy  [address]  P: [phone]  F: [phone] [] [facility]  Outpatient Rehabilitation &  Therapy  [address]  P: [phone]  F: [phone] [] [facility]  Outpatient Rehabilitation &  Therapy  [address]  P: [phone]  F: [phone] [] [facility]  Outpatient Rehabilitation &  Therapy  [address]  P: [phone]  F: [phone]  [] [facility] Outpatient Rehabilitation &  Therapy  [address]  P: [phone]  F: [phone]     Therapy Cancel/No Show note    Date: 2024  Patient: Devin Arthur  : 1955  MRN: [id number]    Cancels/No Shows to date: 1 CX    For today's appointment patient:    [x]  Cancelled    [] Rescheduled appointment    [] No-show     Reason given by patient:    []  Patient ill    []  Conflicting appointment    [] No transportation      [] Conflict with work    [x] No reason given; pt left VM    [] Weather related    [] COVID-19    [] Other:      Comments:        [] Next appointment was confirmed    Electronically signed by: Aury Warner, PT

## 2024-11-13 NOTE — TELEPHONE ENCOUNTER
Received call from patient's wife.    Surgery was 12 days ago.   He is experiencing severe groin and penile pain.   He has tried narcotics and levsin without much relief.   Pain has not improved since surgery.  No hematuria or dysuria. No fever/chills.   Discussed options, will proceed with ER.

## 2024-11-14 LAB — T4 FREE SERPL-MCNC: 1.3 NG/DL (ref 0.93–1.7)

## 2024-11-15 ENCOUNTER — TELEPHONE (OUTPATIENT)
Dept: ONCOLOGY | Age: 69
End: 2024-11-15

## 2024-11-15 ENCOUNTER — OFFICE VISIT (OUTPATIENT)
Dept: ONCOLOGY | Age: 69
End: 2024-11-15
Payer: MEDICARE

## 2024-11-15 ENCOUNTER — HOSPITAL ENCOUNTER (OUTPATIENT)
Age: 69
Discharge: HOME | End: 2024-11-15
Payer: MEDICARE

## 2024-11-15 VITALS
RESPIRATION RATE: 16 BRPM | SYSTOLIC BLOOD PRESSURE: 110 MMHG | WEIGHT: 148.9 LBS | DIASTOLIC BLOOD PRESSURE: 72 MMHG | BODY MASS INDEX: 19.64 KG/M2 | TEMPERATURE: 97.3 F | HEART RATE: 66 BPM

## 2024-11-15 DIAGNOSIS — C61 PROSTATE CANCER (HCC): Primary | ICD-10-CM

## 2024-11-15 DIAGNOSIS — D50.0 IRON DEFICIENCY ANEMIA DUE TO CHRONIC BLOOD LOSS: ICD-10-CM

## 2024-11-15 DIAGNOSIS — C61 PROSTATE CANCER (HCC): ICD-10-CM

## 2024-11-15 DIAGNOSIS — C09.9 TONSILLAR CANCER (HCC): ICD-10-CM

## 2024-11-15 LAB
BASOPHILS # BLD: 0.06 K/UL (ref 0–0.2)
BASOPHILS NFR BLD: 1 % (ref 0–2)
EOSINOPHIL # BLD: 0.13 K/UL (ref 0–0.44)
EOSINOPHILS RELATIVE PERCENT: 2 % (ref 1–4)
ERYTHROCYTE [DISTWIDTH] IN BLOOD BY AUTOMATED COUNT: 13.1 % (ref 11.8–14.4)
FERRITIN SERPL-MCNC: 787 NG/ML
HCT VFR BLD AUTO: 31.4 % (ref 40.7–50.3)
HGB BLD-MCNC: 10.4 G/DL (ref 13–17)
IMM GRANULOCYTES # BLD AUTO: 0 K/UL (ref 0–0.3)
IMM GRANULOCYTES NFR BLD: 0 %
IRON SATN MFR SERPL: 20 % (ref 20–55)
IRON SERPL-MCNC: 52 UG/DL (ref 61–157)
LYMPHOCYTES NFR BLD: 0.44 K/UL (ref 1.1–3.7)
LYMPHOCYTES RELATIVE PERCENT: 7 % (ref 24–43)
MCH RBC QN AUTO: 31.5 PG (ref 25.2–33.5)
MCHC RBC AUTO-ENTMCNC: 33.1 G/DL (ref 28.4–34.8)
MCV RBC AUTO: 95.2 FL (ref 82.6–102.9)
MONOCYTES NFR BLD: 0.32 K/UL (ref 0.1–1.2)
MONOCYTES NFR BLD: 5 % (ref 3–12)
MORPHOLOGY: NORMAL
NEUTROPHILS NFR BLD: 85 % (ref 36–65)
NEUTS SEG NFR BLD: 5.35 K/UL (ref 1.5–8.1)
NRBC BLD-RTO: 0 PER 100 WBC
PLATELET # BLD AUTO: 389 K/UL (ref 138–453)
PMV BLD AUTO: 9.3 FL (ref 8.1–13.5)
PSA SERPL-MCNC: 0.6 NG/ML (ref 0–4)
RBC # BLD AUTO: 3.3 M/UL (ref 4.21–5.77)
TIBC SERPL-MCNC: 263 UG/DL (ref 250–450)
UNSATURATED IRON BINDING CAPACITY: 211 UG/DL (ref 112–347)
WBC OTHER # BLD: 6.3 K/UL (ref 3.5–11.3)

## 2024-11-15 PROCEDURE — 83550 IRON BINDING TEST: CPT

## 2024-11-15 PROCEDURE — 36415 COLL VENOUS BLD VENIPUNCTURE: CPT

## 2024-11-15 PROCEDURE — 99211 OFF/OP EST MAY X REQ PHY/QHP: CPT | Performed by: INTERNAL MEDICINE

## 2024-11-15 PROCEDURE — 85025 COMPLETE CBC W/AUTO DIFF WBC: CPT

## 2024-11-15 PROCEDURE — 83540 ASSAY OF IRON: CPT

## 2024-11-15 PROCEDURE — 84153 ASSAY OF PSA TOTAL: CPT

## 2024-11-15 PROCEDURE — 82728 ASSAY OF FERRITIN: CPT

## 2024-11-15 RX ORDER — DRONABINOL 2.5 MG/1
2.5 CAPSULE ORAL
Qty: 60 CAPSULE | Refills: 0 | Status: SHIPPED | OUTPATIENT
Start: 2024-11-15 | End: 2024-12-15

## 2024-11-15 NOTE — TELEPHONE ENCOUNTER
KATHERINE HERE FOR FOLLOW UP   Labs today  Rad onc referral (prostate cancer)  PET scan in Mid December and follow up  LABS ORDERED: PSA. CBC, FERRITIN, IRON & TIBC   PET: 12/11/24 @ 8AM ARRIVAL @ 7:30AM   RAD ONC: WRITER CALLED OFFICE & LEFT A MESSAGE FOR THEM TO CALL PT   MD VISIT: 12/18/24 @ 9:15AM   LABS PRINTED AND GIVEN ON EXIT   AVS PRINTED AND GIVEN ON EXIT

## 2024-11-15 NOTE — PROGRESS NOTES
Patient ID: Devin Arthur, 1955, [id number], 68 y.o.  Referred by :  AMINATA Luciano  Reason for consultation:   p16+ L base of tongue/tonsil SCC - [id number] or stage HERNESTO  - R cervical lymph node biopsy confirms SCC. PET scan shows no distant disease from head and neck although there is potentially prostate primary (biopsy was on hold)     Radiation therapy completed in First week of March 2024.  PET scan on 6/21/2024 showed resolution of the intense activity in the left tonsillar pillar and resolution of active cervical lymphadenopathy suggesting good response to treatment  Prostate cancer prostate biopsy on 9/5/2024, initial PSA 11.1, Columbus score 3+5= 8 grade group 4, staging PSMA PET scan pending    11/1/24 planned for robotic prostatectomy with lymph node dissection, surgical pathology showed prostatic acinar adenocarcinoma, grade group 4, Hernando 3+5 = 8, extraprostatic extension present (left lateral and apical) perineural invasion present, no lymphovascular invasion and negative margins, 2 lymph node negative for metastasis, pathologic staging T3a N0 M0, based on the NCCN risk category appears to have high risk group  Post surgery detectable PSA at 0.6  HISTORY OF PRESENT ILLNESS:    Oncologic History:  Devin Arthur is a 68 y.o. male with recent diagnosis of tonsillar cancer s/p chemoradiation     Patient received his chemoradiation treatment at [place] and now he is moving here to back to [place] a week ago.    Patient has feeding tube in place and mostky getting feeding through tube feeds. He complains of dry mouth and try eat some food orally.    Patient initially presented with worsening pain in the left base of tongue area as well as L neck swelling. He went to ER on 12/8/23 where CT showed 2.7 x 1.8 x 4.8cm enhancing left lateral pharynx and base of tongue mass involving the soft palate. There were also enlarged left (1.8 x 1.3 x 1.8cm, 2.4 x 1.3 x 2.4cm and 1.2 x 0.7 x 2.0cm) and right (1.5 x

## 2024-11-19 ENCOUNTER — PROCEDURE VISIT (OUTPATIENT)
Dept: PODIATRY | Age: 69
End: 2024-11-19
Payer: MEDICARE

## 2024-11-19 ENCOUNTER — OFFICE VISIT (OUTPATIENT)
Dept: UROLOGY | Age: 69
End: 2024-11-19

## 2024-11-19 VITALS
HEART RATE: 72 BPM | SYSTOLIC BLOOD PRESSURE: 128 MMHG | WEIGHT: 148 LBS | HEIGHT: 73 IN | DIASTOLIC BLOOD PRESSURE: 82 MMHG | TEMPERATURE: 97.8 F | BODY MASS INDEX: 19.61 KG/M2 | OXYGEN SATURATION: 96 %

## 2024-11-19 VITALS — WEIGHT: 148 LBS | HEIGHT: 73 IN | BODY MASS INDEX: 19.61 KG/M2

## 2024-11-19 DIAGNOSIS — C61 PROSTATE CANCER (HCC): Primary | ICD-10-CM

## 2024-11-19 DIAGNOSIS — N39.46 URINARY INCONTINENCE, MIXED: ICD-10-CM

## 2024-11-19 DIAGNOSIS — Z90.79 HISTORY OF ROBOT-ASSISTED LAPAROSCOPIC RADICAL PROSTATECTOMY: ICD-10-CM

## 2024-11-19 DIAGNOSIS — M79.675 PAIN IN TOE OF LEFT FOOT: ICD-10-CM

## 2024-11-19 DIAGNOSIS — B35.1 ONYCHOMYCOSIS OF TOENAIL: Primary | ICD-10-CM

## 2024-11-19 PROCEDURE — 1123F ACP DISCUSS/DSCN MKR DOCD: CPT | Performed by: PODIATRIST

## 2024-11-19 PROCEDURE — 1126F AMNT PAIN NOTED NONE PRSNT: CPT | Performed by: PODIATRIST

## 2024-11-19 PROCEDURE — 99213 OFFICE O/P EST LOW 20 MIN: CPT | Performed by: PODIATRIST

## 2024-11-19 PROCEDURE — 11750 EXCISION NAIL&NAIL MATRIX: CPT | Performed by: PODIATRIST

## 2024-11-19 PROCEDURE — 99024 POSTOP FOLLOW-UP VISIT: CPT | Performed by: NURSE PRACTITIONER

## 2024-11-19 ASSESSMENT — ENCOUNTER SYMPTOMS
EYE PAIN: 0
SHORTNESS OF BREATH: 0
NAUSEA: 0
BACK PAIN: 0
WHEEZING: 0
COLOR CHANGE: 0
COUGH: 0
ALLERGIC/IMMUNOLOGIC NEGATIVE: 1
VOMITING: 0
EYES NEGATIVE: 1
ABDOMINAL PAIN: 0
EYE REDNESS: 0
RESPIRATORY NEGATIVE: 1
GASTROINTESTINAL NEGATIVE: 1

## 2024-11-19 NOTE — PROGRESS NOTES
Review of Systems   Constitutional: Negative.  Negative for appetite change, chills and fever.   HENT: Negative.     Eyes: Negative.  Negative for pain, redness and visual disturbance.   Respiratory: Negative.  Negative for cough, shortness of breath and wheezing.    Cardiovascular: Negative.  Negative for chest pain and leg swelling.   Gastrointestinal: Negative.  Negative for abdominal pain, nausea and vomiting.   Endocrine: Negative.    Genitourinary: Negative.  Negative for difficulty urinating, dysuria, flank pain, frequency, hematuria, testicular pain and urgency.   Musculoskeletal: Negative.  Negative for back pain, joint swelling and myalgias.   Skin: Negative.  Negative for color change, rash and wound.   Allergic/Immunologic: Negative.    Neurological: Negative.  Negative for dizziness, tremors, weakness, numbness and headaches.   Hematological: Negative.  Negative for adenopathy. Does not bruise/bleed easily.   Psychiatric/Behavioral: Negative.       
oriented to person, place and time.  Psych: Mood normal, affect normal  Skin: No rash noted  Lungs: Respiratory effort is normal  Cardiovascular: Warm & Pink  Abdomen: Soft, non-tender, non-distended with no CVA,  No flank tenderness.  abd surgical incisions clean and dry. Well approximated No surrounding erythema, edema, induration, warmth, tenderness, or drainage to indicate infection.  Bladder non-tender and not distended.  Musculoskeletal: Normal gait and station      Assessment and Plan      1. Prostate cancer (HCC)    2. History of robot-assisted laparoscopic radical prostatectomy    3. Urinary incontinence, mixed           Plan:    Assessment & Plan   S/p RALP, slowly recovering.   Referred to radiation oncology d/t high risk group, fu 11/26  PSA has not went to undetectable, would repeat in 4-6 weeks.   Vik reviewed, referred to Aury Warner.   ED discussion-- held off.   F/u 4-6 weeks or sooner if needed.      Return in about 6 weeks (around 12/31/2024) for Dr. Silva with PSA (pt prefers to talk to Dr. MONTANA). .    Prescriptions Ordered:  No orders of the defined types were placed in this encounter.    Orders Placed:  Orders Placed This Encounter   Procedures    Culture, Urine     Standing Status:   Future     Standing Expiration Date:   11/19/2025    PSA, Diagnostic     Standing Status:   Future     Standing Expiration Date:   5/19/2026           ALONZO Lees CNP    Reviewed and agree with the ROS entered by the MA.

## 2024-11-20 ENCOUNTER — OFFICE VISIT (OUTPATIENT)
Dept: PALLATIVE CARE | Age: 69
End: 2024-11-20

## 2024-11-20 VITALS
OXYGEN SATURATION: 95 % | BODY MASS INDEX: 19.62 KG/M2 | HEART RATE: 65 BPM | SYSTOLIC BLOOD PRESSURE: 121 MMHG | DIASTOLIC BLOOD PRESSURE: 78 MMHG | TEMPERATURE: 97.8 F | WEIGHT: 148.7 LBS

## 2024-11-20 DIAGNOSIS — C09.9 SQUAMOUS CELL CARCINOMA OF TONSIL (HCC): ICD-10-CM

## 2024-11-20 DIAGNOSIS — F41.1 GENERALIZED ANXIETY DISORDER: Primary | ICD-10-CM

## 2024-11-20 DIAGNOSIS — F33.9 EPISODE OF RECURRENT MAJOR DEPRESSIVE DISORDER, UNSPECIFIED DEPRESSION EPISODE SEVERITY (HCC): ICD-10-CM

## 2024-11-20 DIAGNOSIS — C61 PROSTATE CANCER (HCC): ICD-10-CM

## 2024-11-20 NOTE — PROGRESS NOTES
Palliative Care Clinic Progress Note    Patient: Devin Arthur  DOC: 1955    Consulting physician: Serge Killian DO    HISTORY OF PRESENT ILLNESS:   Devin Arthur is a 68 year old gentleman who was referred to the [place] outpatient palliative care office at [facility] at the recommendation of his oncologist. He is very pleasant and is accompanied by his wife today. 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 He has completed chemo and radiation for this but has recently been in the initial stages of working up a prostate lesion. PET shows no definite metabolic active tonsillar issues. He is following with urology and plans to have biopsy of his prostate in the coming weeks.      He has a PEG tube and had been dependent on that for feeds up until recently. He had lost a significant amount of weight after radiation to his neck area. However, he has recently began to increase his oral intake. He is putting weight back on. He is actually requesting PEG tube to be removed, as he has been doing so well with his weight. His weakness and fatigue are improving as well.     He follows with ENT for issues with his oral cavity. He still has issues swallowing certain foods, such as bread. He has oral lubricants and saliva stimulants that he is dependent on. His taste is still \"off.\" His tongue is sensitive to certain things. Appetite remains poor, but his wife pushes him and reminds him to eat. He is currently on remeron to help with his appetite, and the doses have been adjusted as well.     His major complaints are two-fold. First is poor sleep. The other is significant anxiety/depression.     With regards to his sleep. He has issues falling asleep. He cannot stay asleep either. He sleeps for maybe 30 minutes and then is up for 45 min-1 hour before dozing back off for 30 minutes at a time. This is quite troubling to both he 
5

## 2024-11-21 ENCOUNTER — TELEPHONE (OUTPATIENT)
Dept: ONCOLOGY | Age: 69
End: 2024-11-21

## 2024-11-21 ASSESSMENT — ENCOUNTER SYMPTOMS
SHORTNESS OF BREATH: 0
COLOR CHANGE: 0
BACK PAIN: 0
NAUSEA: 0
DIARRHEA: 0

## 2024-11-21 NOTE — PROGRESS NOTES
[facility] Podiatry  Return Patient Progress Note    Subjective: Devin Arthur 68 y.o. male that presents for follow up evaluation of thick painful toenail to the left great toe.  Chief Complaint   Patient presents with    Procedure     Nail removal left hallux     Patient's treatment thus far has included debridement.  Pain is rated 6 out of 10 and is described as intermittent. Patient is prepared to have his toenail removed today.    Review of Systems   Constitutional:  Negative for activity change, appetite change, chills, diaphoresis, fatigue and fever.   Respiratory:  Negative for shortness of breath.    Cardiovascular:  Negative for leg swelling.   Gastrointestinal:  Negative for diarrhea and nausea.   Endocrine: Negative for cold intolerance, heat intolerance and polyuria.   Musculoskeletal:  Positive for arthralgias. Negative for back pain, gait problem, joint swelling and myalgias.   Skin:  Negative for color change, pallor, rash and wound.   Allergic/Immunologic: Negative for environmental allergies and food allergies.   Neurological:  Negative for dizziness, weakness, light-headedness and numbness.   Hematological:  Does not bruise/bleed easily.   Psychiatric/Behavioral:  Negative for behavioral problems, confusion and self-injury. The patient is not nervous/anxious.        Objective: Clinical evaluation of the patient reveals thickness, discoloration, brittleness, and subungual debris to the left hallux nail plate.  This nail plate is extremely thick.  There is pain with palpation to this toenail.  No signs of bacterial infection are noted.    Assessment:    Diagnosis Orders   1. Onychomycosis of toenail  REMOVAL OF NAIL MATRIX      2. Pain in toe of left foot  REMOVAL OF NAIL MATRIX            Plan: 1. Clinical evaluation of the patient. 2.  I recommended a total nail avulsion with chemical matricectomy to the left hallux. A consent was signed and placed in the chart. The left hallux was anesthetized

## 2024-11-21 NOTE — TELEPHONE ENCOUNTER
[facility]- MEDICAL NUTRITION THERAPY     Visit Type: Follow-up     NUTRITION RECOMMENDATIONS / MONITORING / EVALUATION  Encouraged PO intake as tolerated.   Recommend resume oral nutrition supplements until appetite returns.   Will periodically f/u on PO tolerance/intake, weight, and care plans. Encouraged pt to call writer as needed.     Subjective/Current Data:  Devin Arthur is a 68 y.o. male with h/o tonsillar cancer, s/p chemoradiation in [place], PEG placement (removed in Sept 2024).   Pt reports decreased appetite/intake and + weight loss after prostate surgery 2 weeks ago. Pt reports he is starting to feel a little better; anticipate appetite will improve as pt improves. Pt reports he stopped using Boost Plus supplements, but plans to resume d/t decreased appetite and weight loss. No difficulty swallowing, except with dry foods d/t dry mouth.     Objective Data:  Patient Active Problem List    Diagnosis Date Noted    Prostate cancer (HCC) 11/01/2024    Prostate CA (HCC) 11/01/2024    Insomnia due to medical condition 07/31/2024    Squamous cell carcinoma of tonsil (HCC) 07/31/2024    Episode of recurrent major depressive disorder (HCC) 07/31/2024    Benign prostatic hyperplasia with lower urinary tract symptoms 07/31/2024    Generalized anxiety disorder 07/31/2024    Tonsillar cancer (HCC) 06/28/2024     Anthropometric Measures:  Height: 6' 1\"  Weight: 148 lb 11.2 oz (67.4 kg)    Ideal Body Weight: 184 lb (83.6 kg)  BMI: 19.62 kg/m2 (Normal Weight)   Weight Change: 5% loss over past 3 weeks    Wt Readings from Last 20 Encounters:   11/20/24 67.4 kg (148 lb 11.2 oz)   11/19/24 67.1 kg (148 lb)   11/19/24 67.1 kg (148 lb)   11/15/24 67.5 kg (148 lb 14.4 oz)   11/01/24 71.2 kg (157 lb)   10/22/24 71.2 kg (157 lb)   10/15/24 70.8 kg (156 lb)   10/15/24 73 kg (161 lb)   10/11/24 71.4 kg (157 lb 4.8 oz)   10/07/24 73.9 kg (163 lb)   10/03/24 72.6 kg (160 lb)   09/27/24 72.9 kg (160 lb 11.2 oz)   09/25/24 74

## 2024-11-25 RX ORDER — TRAZODONE HYDROCHLORIDE 50 MG/1
50 TABLET, FILM COATED ORAL NIGHTLY
Qty: 30 TABLET | Refills: 0 | OUTPATIENT
Start: 2024-11-25

## 2024-11-26 ENCOUNTER — HOSPITAL ENCOUNTER (OUTPATIENT)
Dept: RADIATION ONCOLOGY | Age: 69
Discharge: HOME | End: 2024-11-26
Payer: MEDICARE

## 2024-11-26 VITALS
HEART RATE: 66 BPM | DIASTOLIC BLOOD PRESSURE: 79 MMHG | SYSTOLIC BLOOD PRESSURE: 119 MMHG | BODY MASS INDEX: 19.68 KG/M2 | WEIGHT: 149.2 LBS | RESPIRATION RATE: 16 BRPM | OXYGEN SATURATION: 94 % | TEMPERATURE: 98.2 F

## 2024-11-26 DIAGNOSIS — C09.9 TONSILLAR CANCER (HCC): ICD-10-CM

## 2024-11-26 DIAGNOSIS — C61 PROSTATE CA (HCC): ICD-10-CM

## 2024-11-26 DIAGNOSIS — C09.9 SQUAMOUS CELL CARCINOMA OF TONSIL (HCC): ICD-10-CM

## 2024-11-26 DIAGNOSIS — C61 PROSTATE CANCER (HCC): Primary | ICD-10-CM

## 2024-11-26 PROCEDURE — 99213 OFFICE O/P EST LOW 20 MIN: CPT | Performed by: RADIOLOGY

## 2024-11-26 ASSESSMENT — PATIENT HEALTH QUESTIONNAIRE - PHQ9
SUM OF ALL RESPONSES TO PHQ9 QUESTIONS 1 & 2: 0
SUM OF ALL RESPONSES TO PHQ QUESTIONS 1-9: 0
1. LITTLE INTEREST OR PLEASURE IN DOING THINGS: NOT AT ALL
SUM OF ALL RESPONSES TO PHQ QUESTIONS 1-9: 0
2. FEELING DOWN, DEPRESSED OR HOPELESS: NOT AT ALL

## 2024-11-26 NOTE — ACP (ADVANCE CARE PLANNING)
Advance Care Planning     Hospice Services: Patient is not currently receiving hospice services/has not received hospice care within the performance year.    Advance Care Planning was discussed with patient, and the patient refused to provide an ACP or surrogate decision maker because:has document form [place], provided with blank [place] document.

## 2024-11-26 NOTE — PROGRESS NOTES
Received from [facility]    Physical Activity     Physical Activity: 0   Stress: Not on File (1/9/2024)    Received from [facility]    Stress     Stress: 0   Social Connections: Not on File (9/25/2024)    Received from [employer]    Social Connections     Connectedness: 0   Intimate Partner Violence: Not on file   Housing Stability: Low Risk  (11/1/2024)    Housing Stability Vital Sign     Unable to Pay for Housing in the Last Year: No     Number of Times Moved in the Last Year: 1     Homeless in the Last Year: No             FALLS RISK SCREEN  Instructions:  Assess the patient and enter the appropriate indicators that are present for fall risk identification.   Total the numbers entered and assign a fall risk score from Table 2.  Reassess patient at a minimum every 12 weeks or with status change.    Assessment   Date  11/26/2024     1.  Mental Ability: confusion/cognitively impaired 0     2.  Elimination Issues: incontinence, frequency 3       3.  Ambulatory: use of assistive devices (walker, cane, off-loading devices),        attached to equipment (IV pole, oxygen) 0     4.  Sensory Limitations: dizziness, vertigo, impaired vision 0     5.  Age less than 65        0     6.  Age 65 or greater 1     7.  Medication: diuretics, strong analgesics, hypnotics, sedatives,        antihypertensive agents 0   8.  Falls:  recent history of falls within the last 3 months (not to include slipping or        tripping) 0   TOTAL 4    If score of 4 or greater was education given? Yes       TABLE 2   Risk Score Risk Level Plan of Care   0-3 Little or  No Risk 1.  Provide assistance as indicated for ambulation activities  2.  Reorient confused/cognitively impaired patient  3.  Call-light/bell within patient's reach  4.  Chair/bed in low position, stretcher/bed with siderails up except when performing patient care activities  5.  Educate patient/family/caregiver on falls prevention  6.  Reassess in 12 weeks or with any noted change in

## 2024-11-27 ENCOUNTER — TELEPHONE (OUTPATIENT)
Dept: UROLOGY | Age: 69
End: 2024-11-27

## 2024-11-27 ENCOUNTER — TELEPHONE (OUTPATIENT)
Dept: ONCOLOGY | Age: 69
End: 2024-11-27

## 2024-11-27 NOTE — TELEPHONE ENCOUNTER
Per Dr. Yancey (ONC) patient need ADT. Verbal per Dr. Silva ok to add patient on schedule of Eligard as soon as possible.   Patient added on 12/04/24 in office 10:45am for Eligard shot    Please double check insurance for 12/04/24 if precert is need, please obtain precert

## 2024-11-27 NOTE — CONSULTS
Disp: 60 capsule, Rfl: 0    LORazepam (ATIVAN) 0.5 MG tablet, Take 1 tablet by mouth every 8 hours as needed for Anxiety for up to 30 days. Max Daily Amount: 1.5 mg, Disp: 90 tablet, Rfl: 0    docusate sodium (COLACE) 100 MG capsule, Take 1 capsule by mouth 2 times daily as needed for Constipation, Disp: 60 capsule, Rfl: 0    omeprazole (PRILOSEC) 20 MG delayed release capsule, Take 1 capsule by mouth daily, Disp: , Rfl:     traZODone (DESYREL) 50 MG tablet, Take 1 tablet by mouth nightly, Disp: 30 tablet, Rfl: 0    ferrous sulfate (IRON 325) 325 (65 Fe) MG tablet, Take 1 tablet by mouth daily (with breakfast) (Patient not taking: Reported on 11/1/2024), Disp: 90 tablet, Rfl: 1    Pseudoephedrine HCl (SUDAFED PO), Take 30 mg by mouth as needed, Disp: , Rfl:     Xylitol (XYLIMELTS MT), Take by mouth as needed, Disp: , Rfl:     mirtazapine (REMERON) 15 MG tablet, Take 1 tablet by mouth nightly, Disp: , Rfl:     acetaminophen (TYLENOL) 325 MG tablet, Take 1 tablet by mouth every 6 hours as needed, Disp: , Rfl:     fluticasone (FLONASE) 50 MCG/ACT nasal spray, 2 sprays daily, Disp: , Rfl:     ibuprofen (ADVIL;MOTRIN) 200 MG tablet, Take 1 tablet by mouth every 6 hours as needed, Disp: , Rfl:     pantoprazole (PROTONIX) 20 MG tablet, Take 1 tablet by mouth, Disp: , Rfl:     diphenhydrAMINE (BENADRYL) 25 MG capsule, Take 1 capsule by mouth every 6 hours as needed for Itching, Disp: , Rfl:     Pseudoeph-Doxylamine-DM-APAP (NYQUIL PO), Take by mouth, Disp: , Rfl:     DICLOFENAC PO, Take by mouth as needed, Disp: , Rfl:     ALLERGIES:   Allergies   Allergen Reactions    Grass Pollen(K-O-R-T-Swt Álvaro) Other (See Comments)     Sinus problems       SOCIAL HISTORY:  Social History     Socioeconomic History    Marital status:    Tobacco Use    Smoking status: Never     Passive exposure: Current    Smokeless tobacco: Never   Substance and Sexual Activity    Alcohol use: Not Currently    Drug use: Never     Social

## 2024-11-27 NOTE — TELEPHONE ENCOUNTER
Name: Devin Arthur  : 1955  MRN: [id number]    Oncology Navigation- Initial Note:    Intake-  Contact Type: Telephone    Diagnosis: Prostate    Home Disposition: Lives with other who is able to assist    Patient needs and barriers to care: Emotional Issues/ Fear/ Anxiety     Referral Source: Health Professional    Interventions-   General Interventions: none     Education/Screenings:  no     Currently on HRT: no     Referrals: none     Biopsy site status: prostate     Smoking hx: never       Continuum of Care: Diagnosis/Active Treatment    Notes: Writer called pt to introduce self as navigator and spoke to pt and his wife on speaker phone. Name and contact number provided and writer explained my role in pts care moving forward. Pt lives at home with wife and does have support. No barriers to care identified today.    Pt had prostectomy and is having urinary incontinence issues. Pt starts pelvic floor therapy  on 24. Pt needs ADT 6 month injection per urology. Writer will route this note to Celeste in urology office to initiate ADT injection soon. Pt will be leaving in January to go to [place] until 2025 and plan would be to start radiation planning upon pts return early next year.    Home number in [place] [phone] to call and set up teach/sim appt in April upon their return    Writer encouraged pt and spouse to reach with any needs or questions. Both are appreciative of support. Will continue to follow.    Electronically signed by Sarah Vyas RN on 2024 at 11:44 AM

## 2024-11-27 NOTE — TELEPHONE ENCOUNTER
Left voicemail for patient with date and time for Eligard shot in office 12/804/24 10:45am. Verbal ok to add patient on from Dr. Silva

## 2024-12-01 DIAGNOSIS — G47.01 INSOMNIA DUE TO MEDICAL CONDITION: ICD-10-CM

## 2024-12-02 ENCOUNTER — TELEPHONE (OUTPATIENT)
Dept: ONCOLOGY | Age: 69
End: 2024-12-02

## 2024-12-02 ENCOUNTER — HOSPITAL ENCOUNTER (OUTPATIENT)
Dept: PHYSICAL THERAPY | Facility: CLINIC | Age: 69
Setting detail: THERAPIES SERIES
Discharge: HOME | End: 2024-12-02
Payer: MEDICARE

## 2024-12-02 PROCEDURE — 97110 THERAPEUTIC EXERCISES: CPT

## 2024-12-02 PROCEDURE — 97530 THERAPEUTIC ACTIVITIES: CPT

## 2024-12-02 PROCEDURE — 97161 PT EVAL LOW COMPLEX 20 MIN: CPT

## 2024-12-02 RX ORDER — TRAZODONE HYDROCHLORIDE 50 MG/1
50 TABLET, FILM COATED ORAL NIGHTLY
Qty: 30 TABLET | Refills: 0 | Status: SHIPPED | OUTPATIENT
Start: 2024-12-02

## 2024-12-02 NOTE — TELEPHONE ENCOUNTER
Name: Devin Arthur  : 1955  MRN: [id number]    Oncology Navigation Follow-Up Note    Contact Type:  Telephone    Notes: Pts wife called writer asking for a referral for speech therapy for pt as pt was treated for head and neck cancer in [place] and never finished therapy there. Miryam also states that they spoke to Dr. Atkinson prior about getting PORT removed and is asking if an order can be placed to remove PORT. PORT was placed in [place] also. Will route this note to triage nurses to address above concerns. Miryam aware of writers plan and she's appreciative.      Electronically signed by Sarah Vyas RN on 2024 at 11:20 AM

## 2024-12-02 NOTE — FLOWSHEET NOTE
Yong Fall Risk Assessment    Patient Name:  Devin Arthur  : 1955    Risk Factor Scale  Score   History of Falls [] Yes  [x] No 25  0 0   Secondary Diagnosis [] Yes  [x] No 15  0 0   Ambulatory Aid [] Furniture  [] Crutches/cane/walker  [x] None/bedrest/wheelchair/nurse 30  15  0 0   IV/Heparin Lock [] Yes  [x] No 20  0 0   Gait/Transferring [] Impaired  [] Weak  [x] Normal/bedrest/immobile 20  10  0 0   Mental Status [] Forgets limitations  [x] Oriented to own ability 15  0 0      Total:0     Based on the Assessment score: check the appropriate box.    [x]  No intervention needed   Low =   Score of 0-24    []  Use standard prevention interventions Moderate =  Score of 24-44   [] Give patient handout and discuss fall prevention strategies   [] Establish goal of education for patient/family RE: fall prevention strategies    []  Use high risk prevention interventions High = Score of 45 and higher   [] Give patient handout and discuss fall prevention strategies   [] Establish goal of education for patient/family Re: fall prevention strategies   [] Discuss lifeline / other resources    Electronically signed by:   Aury Warner, PT  Date: 2024

## 2024-12-03 ENCOUNTER — TELEPHONE (OUTPATIENT)
Dept: ONCOLOGY | Age: 69
End: 2024-12-03

## 2024-12-03 ENCOUNTER — OFFICE VISIT (OUTPATIENT)
Dept: PODIATRY | Age: 69
End: 2024-12-03
Payer: MEDICARE

## 2024-12-03 ENCOUNTER — TELEPHONE (OUTPATIENT)
Dept: PALLATIVE CARE | Age: 69
End: 2024-12-03

## 2024-12-03 VITALS — BODY MASS INDEX: 19.75 KG/M2 | WEIGHT: 149 LBS | HEIGHT: 73 IN

## 2024-12-03 DIAGNOSIS — M79.675 PAIN IN TOE OF LEFT FOOT: ICD-10-CM

## 2024-12-03 DIAGNOSIS — B35.1 ONYCHOMYCOSIS OF TOENAIL: Primary | ICD-10-CM

## 2024-12-03 PROCEDURE — 1123F ACP DISCUSS/DSCN MKR DOCD: CPT | Performed by: PODIATRIST

## 2024-12-03 PROCEDURE — 99213 OFFICE O/P EST LOW 20 MIN: CPT | Performed by: PODIATRIST

## 2024-12-03 PROCEDURE — 1159F MED LIST DOCD IN RCRD: CPT | Performed by: PODIATRIST

## 2024-12-03 PROCEDURE — 1125F AMNT PAIN NOTED PAIN PRSNT: CPT | Performed by: PODIATRIST

## 2024-12-03 ASSESSMENT — ENCOUNTER SYMPTOMS
SHORTNESS OF BREATH: 0
COLOR CHANGE: 0
NAUSEA: 0
DIARRHEA: 0
BACK PAIN: 0

## 2024-12-03 NOTE — PROGRESS NOTES
[facility]  Return Patient Progress Note    Subjective: Devin Arthur 68 y.o. male that presents for follow up evaluation of permanent removal of the left great toenail.  Chief Complaint   Patient presents with    Nail Problem     Left great toe procedure follow up     Patient's treatment thus far has included daily dressing changes with antibiotic ointment and a Band-Aid.  Pain is rated 3 out of 10 and is described as intermittent. Patient has been following my prescribed course of therapy as instructed.     Review of Systems   Constitutional:  Negative for activity change, appetite change, chills, diaphoresis, fatigue and fever.   Respiratory:  Negative for shortness of breath.    Cardiovascular:  Negative for leg swelling.   Gastrointestinal:  Negative for diarrhea and nausea.   Endocrine: Negative for cold intolerance, heat intolerance and polyuria.   Musculoskeletal:  Positive for arthralgias. Negative for back pain, gait problem, joint swelling and myalgias.   Skin:  Positive for wound. Negative for color change, pallor and rash.   Allergic/Immunologic: Negative for environmental allergies and food allergies.   Neurological:  Negative for dizziness, weakness, light-headedness and numbness.   Hematological:  Does not bruise/bleed easily.   Psychiatric/Behavioral:  Negative for behavioral problems, confusion and self-injury. The patient is not nervous/anxious.        Objective: Clinical evaluation of the patient reveals absence to the left hallux nail plate.  The nail bed is granular with some serous drainage noted.  There is mild erythema noted to the left hallux, but no calor is noted.  There is no purulence or malodor noted.  There is pain with manipulation of the left hallux.    Assessment:    Diagnosis Orders   1. Onychomycosis of toenail        2. Pain in toe of left foot              Plan: 1. Clinical evaluation of the patient. 2.  The left hallux was dressed with antibiotic ointment and a dry

## 2024-12-03 NOTE — TELEPHONE ENCOUNTER
Counseling referral received for patient.  called patient to schedule appointment and left a message.

## 2024-12-03 NOTE — TELEPHONE ENCOUNTER
Pt called and is requesting a 3month supply of trazadone because he will be out of town the next few months.   Please advise.

## 2024-12-04 ENCOUNTER — PROCEDURE VISIT (OUTPATIENT)
Dept: UROLOGY | Age: 69
End: 2024-12-04
Payer: MEDICARE

## 2024-12-04 ENCOUNTER — TELEPHONE (OUTPATIENT)
Dept: UROLOGY | Age: 69
End: 2024-12-04

## 2024-12-04 ENCOUNTER — HOSPITAL ENCOUNTER (OUTPATIENT)
Age: 69
Setting detail: SPECIMEN
Discharge: HOME | End: 2024-12-04

## 2024-12-04 ENCOUNTER — TELEPHONE (OUTPATIENT)
Dept: PALLATIVE CARE | Age: 69
End: 2024-12-04

## 2024-12-04 ENCOUNTER — TELEPHONE (OUTPATIENT)
Dept: ONCOLOGY | Age: 69
End: 2024-12-04

## 2024-12-04 VITALS
BODY MASS INDEX: 19.88 KG/M2 | SYSTOLIC BLOOD PRESSURE: 120 MMHG | OXYGEN SATURATION: 97 % | TEMPERATURE: 97.1 F | HEART RATE: 68 BPM | WEIGHT: 150 LBS | DIASTOLIC BLOOD PRESSURE: 82 MMHG | HEIGHT: 73 IN

## 2024-12-04 DIAGNOSIS — R30.0 BURNING WITH URINATION: ICD-10-CM

## 2024-12-04 DIAGNOSIS — C09.9 TONSILLAR CANCER (HCC): Primary | ICD-10-CM

## 2024-12-04 DIAGNOSIS — R30.0 BURNING WITH URINATION: Primary | ICD-10-CM

## 2024-12-04 DIAGNOSIS — R35.0 FREQUENCY OF URINATION: ICD-10-CM

## 2024-12-04 DIAGNOSIS — C61 PROSTATE CANCER (HCC): ICD-10-CM

## 2024-12-04 PROCEDURE — 96402 CHEMO HORMON ANTINEOPL SQ/IM: CPT | Performed by: NURSE PRACTITIONER

## 2024-12-04 ASSESSMENT — ENCOUNTER SYMPTOMS
CONSTIPATION: 0
ABDOMINAL PAIN: 0
VOMITING: 0
EYE REDNESS: 0
DIARRHEA: 0
EYE PAIN: 0
NAUSEA: 0
COUGH: 0
SHORTNESS OF BREATH: 0
WHEEZING: 0
BACK PAIN: 0

## 2024-12-04 NOTE — PROGRESS NOTES
Name: Devin Arthur  : 1955  MRN: [id number]    Oncology Navigation Follow-Up Note    Contact Type:  Telephone    Notes: Pt called writer wanting an update on speech therapy and PT to head and neck. Writer notes that speech therapy order was placed today. Writer placed referral for Onc. Rehab for PT to head/neck also. Pt updated and appreciative. Will continue to follow.      Electronically signed by Sarah Vyas RN on 2024 at 1:21 PM

## 2024-12-04 NOTE — TELEPHONE ENCOUNTER
Spouse returned call. Patient declining counseling services due to being established with therapist already.

## 2024-12-04 NOTE — TELEPHONE ENCOUNTER
Patient came in and dropped off a urine sample. Patient is having UTI symptoms. Patients symptoms include: burning, frequency and urgency.

## 2024-12-04 NOTE — PROGRESS NOTES
Dx: Prostate Cancer  After obtaining written and verbal consent, Eligard 45mg administered in LUQ of abdomen by Fannie Springer RN by order of Audrey Ellington CNP. Patient was instructed to remain in clinic for 20 mins following injection and report any adverse reactions to me immediately. He tolerated the injection without any complications. We reviewed that the side effects include hot flashes, fatigue, breast enlargement, diminished libido, ED and long term development of osteoporosis.  The patient was told he will encouraged to take supplemental Calcium and Vitamin D to prevent the latter.

## 2024-12-04 NOTE — TELEPHONE ENCOUNTER
Called patient back to ask him when he is leaving for vacation. He states Dec 31st. He states he will also need a 3 month supply of ativan along with his trazadone. Pt will be out of the country for 3 months. Dr. Killian approved 90 day supply. I told patient to call back when is ativan is due to be refilled (around the 18th) and we will send 90 days supply for both medicines. He is agreeable.     ..[facility] Palliative Care Coordinator  ARASH Lugo, RN, CHPN

## 2024-12-05 LAB
MICROORGANISM SPEC CULT: NORMAL
SERVICE CMNT-IMP: NORMAL
SPECIMEN DESCRIPTION: NORMAL

## 2024-12-06 ENCOUNTER — TELEPHONE (OUTPATIENT)
Dept: INFUSION THERAPY | Age: 69
End: 2024-12-06

## 2024-12-06 NOTE — TELEPHONE ENCOUNTER
Estuardo from [facility] Prior Auth called reported Pet Skull to mid thigh was denied. He provided Peer 2 Peer for the provider to call. [phone] Case # [id number]

## 2024-12-09 ENCOUNTER — HOSPITAL ENCOUNTER (OUTPATIENT)
Dept: PHYSICAL THERAPY | Facility: CLINIC | Age: 69
Setting detail: THERAPIES SERIES
Discharge: HOME | End: 2024-12-09
Payer: MEDICARE

## 2024-12-09 PROCEDURE — 90912 BFB TRAINING 1ST 15 MIN: CPT

## 2024-12-09 PROCEDURE — 97032 APPL MODALITY 1+ESTIM EA 15: CPT

## 2024-12-09 PROCEDURE — 97530 THERAPEUTIC ACTIVITIES: CPT

## 2024-12-09 NOTE — FLOWSHEET NOTE
in 12 treatments)  Cough, sneeze, laugh without incontinence  Perform basic ADL's without leaking  3. Pt will report an 8 point improvement on CORY to indicate improved urogenital functioning.     Patient goals:Regain control of bladder    Pt. Education:  [x] Yes  [] No  [] Reviewed Prior HEP/Ed  Method of Education: [x] Verbal  [] Demo  [] Written  Comprehension of Education:  [x] Verbalizes understanding.  [] Demonstrates understanding.  [] Needs review.  [] Demonstrates/verbalizes HEP/Ed previously given.     Plan: [x] Continue current frequency toward long and short term goals.    [x] Specific Instructions for subsequent treatments: Biofeedback, PF ES. Progress PF/core/hip ex.       Time In:1:00 pm            Time Out: 1:55 pm    Electronically signed by:  TABITHA PEÑA PTA

## 2024-12-10 ENCOUNTER — PREP FOR PROCEDURE (OUTPATIENT)
Age: 69
End: 2024-12-10

## 2024-12-10 ENCOUNTER — TELEPHONE (OUTPATIENT)
Age: 69
End: 2024-12-10

## 2024-12-10 DIAGNOSIS — C09.9 CANCER OF TONSIL (HCC): ICD-10-CM

## 2024-12-10 NOTE — TELEPHONE ENCOUNTER
Called patient left message to call back and schedule a procedure.  PORT A CATHETER REMOVALf  Referral in chart.

## 2024-12-11 DIAGNOSIS — C09.9 TONSILLAR CANCER (HCC): Primary | ICD-10-CM

## 2024-12-11 NOTE — TELEPHONE ENCOUNTER
MY EXAM IS SCHEDULED FOR; Port a Catheter Removal      Pre-testing: Monday 3/31/2025 at 10:30 am  SURGERY DATE:  Wednesday 4/9/2025    TIME:  11:00 am    ARRIVAL TIME:  9;00 am    LOCATION:  [facility] [place]      Stop Advil/Ibuprofen 5 days prior to procedure.    Called patient and reviewed instructions.  Mailed Instructions 12/11/2024    Please send orders.

## 2024-12-12 ENCOUNTER — HOSPITAL ENCOUNTER (OUTPATIENT)
Facility: CLINIC | Age: 69
Setting detail: THERAPIES SERIES
Discharge: HOME | End: 2024-12-12
Payer: MEDICARE

## 2024-12-12 PROCEDURE — 92610 EVALUATE SWALLOWING FUNCTION: CPT

## 2024-12-13 DIAGNOSIS — F41.1 GENERALIZED ANXIETY DISORDER: Primary | ICD-10-CM

## 2024-12-13 NOTE — CONSULTS
[] [facility]  Outpatient Rehabilitation &  Therapy  [address]  [phone]  F: [phone] [] [facility]  Outpatient Rehabilitation &  Therapy  [address]  P: [phone]  F: [phone] [] [facility]  Outpatient Rehabilitation &  Therapy  [address]   P: [phone]  F: [phone] [] [facility] Outpatient Rehabilitation &  Therapy  [address]  P: [phone]  F: [phone]         Speech Language Pathology  Initial Assessment   Date:  2024  Patient: Devin Arthur  : 1955  MRN: [id number]  Physician: ***     Insurance: ***  Medical Diagnosis: ***    Rehab Codes: ***  Onset date: ***    Next 's appt.: ***      Subjective/Patient Report:     PMHx: [] Unremarkable [] Diabetes [] HTN  [] Pacemaker   [] MI/Heart Problems [] Cancer [] Arthritis [] Other:              [] Refer to full medical chart  In EPIC     Comorbidities:   [] Obesity [] Dialysis  [] N/A   [] Asthma/COPD [] Dementia [] Other:   [] Stroke [] Sleep apnea [] Other:   [] Vascular disease [] Rheumatic disease [] Other:       Pain:  [] Yes  [] No   Location: ***   Pain Rating: (0-10 scale) ***/10  Pain altered Tx:  [] Yes  [] No  Action:       Medications: [] Refer to full medical record [] None [] Other:  Allergies:      [] Refer to full medical record [] None [] Other:  Precautions:      Hearing [] WFL    [] Other:   Vision [] WFL [] Other:   Hand Dominance  [] Right [] Left   Dentition  [] WFL [] Partials:  [] Dentures:  [] Other:          Objective   Prior Level of Function Current Level of Function   Swallowing []WNL  [] Impaired []WNL  [] Impaired   Cognition []WNL  [] Impaired []WNL  [] Impaired   Voice []WNL  [] Impaired []WNL  [] Impaired   Speech/Language []WNL  [] Impaired []WNL  [] Impaired       Recent Chest Xray/CT of Chest: (*** Date *** )      Current Diet Consistency:       Current Method of Taking

## 2024-12-16 ENCOUNTER — HOSPITAL ENCOUNTER (OUTPATIENT)
Dept: PHYSICAL THERAPY | Facility: CLINIC | Age: 69
Setting detail: THERAPIES SERIES
Discharge: HOME | End: 2024-12-16
Payer: MEDICARE

## 2024-12-16 PROCEDURE — 97140 MANUAL THERAPY 1/> REGIONS: CPT

## 2024-12-16 PROCEDURE — 97110 THERAPEUTIC EXERCISES: CPT

## 2024-12-16 PROCEDURE — 97161 PT EVAL LOW COMPLEX 20 MIN: CPT

## 2024-12-16 RX ORDER — LORAZEPAM 0.5 MG/1
0.5 TABLET ORAL EVERY 8 HOURS PRN
Qty: 360 TABLET | Refills: 0 | Status: SHIPPED | OUTPATIENT
Start: 2024-12-16 | End: 2024-12-17 | Stop reason: SDUPTHER

## 2024-12-16 NOTE — CONSULTS
Achievement:  [x] No  [] Yes:  Domestic Concerns:  [x] No  [] Yes:    Pt. Education:  [x] Plans/Goals, Risks/Benefits discussed  [x] Home exercise program  Method of Education: [x] Verbal  [x] Demo  [x] Written  Comprehension of Education:  [x] Verbalizes understanding.  [] Demonstrates understanding.  [] Needs Review.  [] Demonstrates/verbalizes understanding of HEP/Ed previously given.    Treatment Plan:  [x] Therapeutic Exercise   14707  [] Iontophoresis: 4 mg/mL Dexamethasone Sodium Phosphate  mAmin  27137   [x] Therapeutic Activity  47289 [] Vasopneumatic cold with compression  13728    [] Gait Training   55901 [] Ultrasound   39724   [] Neuromuscular Re-education  74751 [x] Electrical Stimulation Unattended  00261   [x] Manual Therapy  46323 [] Electrical Stimulation Attended  07964   [x] Instruction in HEP  [] Dry Needling   [] Aquatic Therapy   46646 [x] Cold/hotpack    [] Massage   60861      [] Lumbar/Cervical Traction  15573       Frequency: 1-2 x/week for 18 visits    Today's Treatment:  Modalities:   Precautions: H&N Ca, R RTC surg  Manual: H&N PORi protocol, Cerv PROM/stretches, cerv down glides, side glides, manual traction, UT/levator stretch, SOR. MFR/TPr suboccipitals, UT/ Lev stretch, SCM, scalenes. MLD superficial and deep lymphatics (face, head, neck, internal/external ear, LN pumps to sternum, pec and axilla) to H&N. PROM and partial PORi protocol to L/R upper quarter in supine.   Exercises:  Exercise Reps/ Time Weight/ Level Comments   Diaphragmatic breathing 5 cycles       Elbow winging pec     * W/ breath   Wand flexion  10x       Post shld rolls 10x       Scap retraction 10x                 Cerv rot 3x30       UT stretch 3x30   Add lev and scalene   Jaw ex:   *          wall slides                   Ball squeeze 10x  Whole hand, fingers               Other: Instructed pt in therex per log, proper posturing /positioning and issued HO for HEP. Educated on importance of eating and

## 2024-12-17 ENCOUNTER — TELEPHONE (OUTPATIENT)
Dept: ONCOLOGY | Age: 69
End: 2024-12-17

## 2024-12-17 DIAGNOSIS — R13.10 DYSPHAGIA, UNSPECIFIED TYPE: Primary | ICD-10-CM

## 2024-12-17 DIAGNOSIS — F41.1 GENERALIZED ANXIETY DISORDER: ICD-10-CM

## 2024-12-17 DIAGNOSIS — G47.01 INSOMNIA DUE TO MEDICAL CONDITION: ICD-10-CM

## 2024-12-17 NOTE — TELEPHONE ENCOUNTER
WRITER TRIED TO CALL PATIENT BACK TO GET HIM RESCHEDULED FOR 12/18/24. PATIENT STATED VIA VM ON 12/17/24 AT 9:34 AM THAT HE HAS A MASSIVE COLD AND HAS TO CANCEL. A FEMALE ANSWERED THE PHONE BUT HUNG UP. WRITER CALLED RIGHT BACK, BUT PHONE WENT TO . WRITER DID LEAVE A .

## 2024-12-17 NOTE — FLOWSHEET NOTE
Yong Fall Risk Assessment    Patient Name:  Devin Arthur  : 1955    Risk Factor Scale  Score   History of Falls [] Yes  [x] No- has an episode after treatment blacked out and fell 25  0    Secondary Diagnosis [] Yes  [x] No 15  0    Ambulatory Aid [] Furniture  [] Crutches/cane/walker  [x] None/bedrest/wheelchair/nurse 30  15  0    IV/Heparin Lock [] Yes  [x] No 20  0    Gait/Transferring [] Impaired  [] Weak  [x] Normal/bedrest/immobile 20  10  0    Mental Status [] Forgets limitations  [x] Oriented to own ability 15  0       Total:     Based on the Assessment score: check the appropriate box.    [x]  No intervention needed   Low =   Score of 0-24    []  Use standard prevention interventions Moderate =  Score of 24-44   [] Give patient handout and discuss fall prevention strategies   [] Establish goal of education for patient/family RE: fall prevention strategies    []  Use high risk prevention interventions High = Score of 45 and higher   [] Give patient handout and discuss fall prevention strategies   [] Establish goal of education for patient/family Re: fall prevention strategies   [] Discuss lifeline / other resources    Electronically signed by:   Reva Oliveira, PT  Date: 2024

## 2024-12-18 ENCOUNTER — HOSPITAL ENCOUNTER (OUTPATIENT)
Dept: PHYSICAL THERAPY | Facility: CLINIC | Age: 69
Setting detail: THERAPIES SERIES
Discharge: HOME | End: 2024-12-18
Payer: MEDICARE

## 2024-12-18 RX ORDER — TRAZODONE HYDROCHLORIDE 50 MG/1
50 TABLET, FILM COATED ORAL NIGHTLY
Qty: 90 TABLET | Refills: 0 | Status: SHIPPED | OUTPATIENT
Start: 2024-12-18 | End: 2025-03-18

## 2024-12-18 RX ORDER — LORAZEPAM 0.5 MG/1
0.5 TABLET ORAL EVERY 8 HOURS PRN
Qty: 270 TABLET | Refills: 0 | Status: SHIPPED | OUTPATIENT
Start: 2024-12-18 | End: 2025-03-18

## 2024-12-18 NOTE — FLOWSHEET NOTE
[] [facility]  Outpatient Rehabilitation &  Therapy  [address]  [phone]  [phone] [] [facility]  Outpatient Rehabilitation &  Therapy  [address]  P: [phone]  F: [phone] [x] [facility]  Outpatient Rehabilitation &  Therapy  [address]  P: [phone]  F: [phone] [] [facility]  Outpatient Rehabilitation &  Therapy  [address]  [phone]  [phone] [] [place]  Outpatient Rehabilitation &  Therapy  [address]   P: [phone]  F: [phone]  [] [facility]  Outpatient Rehabilitation &  Therapy  [address]  P: [phone]  F: [phone] [] [facility]  Outpatient Rehabilitation &  Therapy  [address]  P: [phone]  F: [phone] [] [facility]  Outpatient Rehabilitation &  Therapy  [address]  P: [phone]  F: [phone] [] [facility]  Outpatient Rehabilitation &  Therapy  [address]  P: [phone]  F: [phone]  [] [facility] Outpatient Rehabilitation &  Therapy  [address]  P: [phone]  F: [phone]     Therapy Cancel/No Show note    Date: 2024  Patient: Devin Arthur  : 1955  MRN: [id number]    Cancels/No Shows to date: 2 CX    For today's appointment patient:    [x]  Cancelled    [] Rescheduled appointment    [] No-show     Reason given by patient:    []  Patient ill    []  Conflicting appointment    [] No transportation      [] Conflict with work    [x] No reason given    [] Weather related    [] COVID-19    [] Other:      Comments:        [] Next appointment was confirmed    Electronically signed by: TABITHA PEÑA PTA

## 2024-12-23 ENCOUNTER — HOSPITAL ENCOUNTER (OUTPATIENT)
Dept: PHYSICAL THERAPY | Facility: CLINIC | Age: 69
Setting detail: THERAPIES SERIES
Discharge: HOME | End: 2024-12-23
Payer: MEDICARE

## 2024-12-23 PROCEDURE — 97110 THERAPEUTIC EXERCISES: CPT

## 2024-12-23 PROCEDURE — 97140 MANUAL THERAPY 1/> REGIONS: CPT

## 2024-12-23 NOTE — FLOWSHEET NOTE
[] [facility]  Outpatient Rehabilitation &  Therapy  [address]  [phone]  [phone] [] [facility]  Outpatient Rehabilitation &  Therapy  [address]  P: [phone]  F: [phone] [x] [facility]  Outpatient Rehabilitation &  Therapy  [address]  P: [phone]  F: [phone] [] [facility]  Outpatient Rehabilitation &  Therapy  [address]  [phone]  [phone] [] [facility]  Outpatient Rehabilitation &  Therapy  [address]   P: [phone]  F: [phone]  [] [facility]  Outpatient Rehabilitation &  Therapy  [address]  P: [phone]  F: [phone] [] [facility]  Outpatient Rehabilitation &  Therapy  [address]  P: [phone]  F: [phone] [] [facility]  Outpatient Rehabilitation &  Therapy  [address]  P: [phone]  F: [phone] [] [facility]  Outpatient Rehabilitation &  Therapy  [address]  P: [phone]  F: [phone]  [] [facility] Outpatient Rehabilitation &  Therapy  [address]  P: [phone]  F: [phone]     Physical Therapy Daily Treatment Note    Date:  2024  Patient Name:  Devin Arthur    :  1955  MRN: [id number]  Physician: Chin Atkinson MD                   Insurance: [facility]/ [facility] MEDICARE ADV $20 co pay AUTH AFTER DARREN, submitted, per Farzana xiong to see  Medical Diagnosis: Tonsillar cancer            Rehab Codes: C09.9, R59.0, 25.611, 25.612, R53.0, L90.5, R29.3   Onset date: 24 script                            Next Dr's appt.: ongoing  PVisit# / total visits: 2/    Cancels/No Shows: 0    Subjective:  Pt reports its not a good day today, tired and weak feeling today and states \"my head feels anxious and sad.\" Notes it was

## 2024-12-26 ENCOUNTER — HOSPITAL ENCOUNTER (OUTPATIENT)
Dept: PHYSICAL THERAPY | Facility: CLINIC | Age: 69
Setting detail: THERAPIES SERIES
Discharge: HOME | End: 2024-12-26
Payer: MEDICARE

## 2024-12-26 ENCOUNTER — HOSPITAL ENCOUNTER (OUTPATIENT)
Age: 69
Discharge: HOME | End: 2024-12-26
Payer: MEDICARE

## 2024-12-26 DIAGNOSIS — C61 PROSTATE CANCER (HCC): ICD-10-CM

## 2024-12-26 LAB — PSA SERPL-MCNC: <0.03 NG/ML (ref 0–4)

## 2024-12-26 PROCEDURE — 84153 ASSAY OF PSA TOTAL: CPT

## 2024-12-26 PROCEDURE — 36415 COLL VENOUS BLD VENIPUNCTURE: CPT

## 2024-12-26 PROCEDURE — 97110 THERAPEUTIC EXERCISES: CPT

## 2024-12-26 PROCEDURE — 97140 MANUAL THERAPY 1/> REGIONS: CPT

## 2024-12-26 NOTE — FLOWSHEET NOTE
[] [facility]  Outpatient Rehabilitation &  Therapy  [address]  [phone]  [phone] [] [facility]  Outpatient Rehabilitation &  Therapy  [address]  P: [phone]  F: [phone] [x] [facility]  Outpatient Rehabilitation &  Therapy  [address]  P: [phone]  F: [phone] [] [facility]  Outpatient Rehabilitation &  Therapy  [address]  [phone]  [phone] [] [facility]  Outpatient Rehabilitation &  Therapy  [address]   P: [phone]  F: [phone]  [] [facility]  Outpatient Rehabilitation &  Therapy  [address]  P: [phone]  F: [phone] [] [facility]  Outpatient Rehabilitation &  Therapy  [address]  P: [phone]  F: [phone] [] [facility]  Outpatient Rehabilitation &  Therapy  [address]  P: [phone]  F: [phone] [] [facility]  Outpatient Rehabilitation &  Therapy  [address]  P: [phone]  F: [phone]  [] [facility] Outpatient Rehabilitation &  Therapy  [address]  P: [phone]  F: [phone]     Physical Therapy Daily Treatment Note/ Discharge    Date:  2024  Patient Name:  Devin Arthur    :  1955  MRN: [id number]  Physician: Chin Atkinson MD                   Insurance: [facility]/ [facility] MEDICARE ADV $20 co pay AUTH AFTER EVAL, 18vs of PT from 24-3/24/25 auth#[id number]   Medical Diagnosis: Tonsillar cancer            Rehab Codes: C09.9, R59.0, 25.611, 25.612, R53.0, L90.5, R29.3   Onset date: 24 script                            Next 's appt.: ongoing  PVisit# / total visits:     Cancels/No Shows: 0    Subjective:  Pt reports he is having some incr acid reflux when eating esperanza protein, states he takes meds for

## 2024-12-30 DIAGNOSIS — C61 PROSTATE CANCER (HCC): Primary | ICD-10-CM

## 2024-12-30 DIAGNOSIS — Z90.79 HISTORY OF ROBOT-ASSISTED LAPAROSCOPIC RADICAL PROSTATECTOMY: ICD-10-CM

## 2025-01-23 ENCOUNTER — TELEPHONE (OUTPATIENT)
Dept: ONCOLOGY | Age: 70
End: 2025-01-23

## 2025-01-23 NOTE — TELEPHONE ENCOUNTER
[facility] Oncology Nutrition Note:   Chart reviewed/called patient for nutrition follow-up. Automatic message received stating \"the number you called has calling restrictions\". Unable to leave a message. Will attempt to contact patient on a later date.

## 2025-01-27 ENCOUNTER — TELEPHONE (OUTPATIENT)
Dept: ONCOLOGY | Age: 70
End: 2025-01-27

## 2025-01-27 NOTE — TELEPHONE ENCOUNTER
Name: Devin Arthur  : 1955  MRN: [id number]    Oncology Navigation Follow-Up Note    Contact Type:  Telephone    Notes: Writer received a call from pts wife Miryam stating she needs the scheduling number to schedule PET scan. Number to [facility] scheduling provided. Pt wife will schedule in early April and will also have PSA drawn same day. Once PET is scheduled, pt will also need a f/u with Dr. Yancey and Dr. Atkinson. Will route this note to Martha in RO to inform and to possibly due a dual appt if it can be done.       Electronically signed by Sarah Vyas RN on 2025 at 2:47 PM

## 2025-01-28 ENCOUNTER — TELEPHONE (OUTPATIENT)
Dept: ONCOLOGY | Age: 70
End: 2025-01-28

## 2025-01-28 NOTE — TELEPHONE ENCOUNTER
Name: Devin Arthur  : 1955  MRN: [id number]    Oncology Navigation Follow-Up Note    Contact Type:  Telephone    Notes: Writer informed pt of dual f/u appts with Dr. Atkinson and Dr. Yancey on 25 at 215pm and 245pm. Pt appreciative of assistance. Will continue to follow.      Electronically signed by Sarah Vyas RN on 2025 at 10:11 AM

## 2025-02-27 ENCOUNTER — TELEPHONE (OUTPATIENT)
Dept: ONCOLOGY | Age: 70
End: 2025-02-27

## 2025-02-27 NOTE — TELEPHONE ENCOUNTER
Called and left pt a voicemail regarding rescheduling their appt from 4/8/2025 to a different time.

## 2025-02-27 NOTE — TELEPHONE ENCOUNTER
[facility] Oncology Nutrition Note:   Chart reviewed/called patient for nutrition follow-up. No answer. Detailed message left requesting call back as able. RD contact information provided.

## 2025-03-17 ENCOUNTER — TELEPHONE (OUTPATIENT)
Dept: ONCOLOGY | Age: 70
End: 2025-03-17

## 2025-03-17 NOTE — TELEPHONE ENCOUNTER
**CALLED  03/17/2025 APPT NEEDS RESCHEDULED. VOICEMAIL IS FULL**  follow up for scan results  dual appt chuy perea (dr. taveras)

## 2025-03-26 ENCOUNTER — CLINICAL DOCUMENTATION (OUTPATIENT)
Dept: PHYSICAL THERAPY | Facility: CLINIC | Age: 70
End: 2025-03-26

## 2025-03-26 NOTE — THERAPY DISCHARGE
[x] [facility]  Outpatient Rehabilitation &  Therapy  [address]  P: [phone]  F: [phone] [] [facility]  Outpatient Rehabilitation &  Therapy  [address]  [phone]  [phone]     Physical Therapy Discharge Note    Date: 3/26/2025      Patient: Devin Arthur  : 1955  MRN: [id number]    Physician:  [facility] Medicare Advantage (12 vs approved 24-25, vs per [place], $20 co-pay per vs)  Medical Diagnosis: Prostate Cancer C61                  Rehab Codes: M62.50, N39.3  Onset Date: 24                                  Next 's appt: 24      Total visits attended:  Cancels/No shows:2  Date of initial visit: 12/3/24                Date of final visit: 24    Discharge Status:     Pt failed to make additional appointments for therapy.  Pt. is now discharged.        Electronically signed by: Aury Warner PT    If you have any questions or concerns, please don't hesitate to call.  Thank you for your referral.

## 2025-03-27 DIAGNOSIS — G47.01 INSOMNIA DUE TO MEDICAL CONDITION: ICD-10-CM

## 2025-03-27 RX ORDER — TRAZODONE HYDROCHLORIDE 50 MG/1
50 TABLET ORAL NIGHTLY
Qty: 30 TABLET | Refills: 0 | Status: SHIPPED | OUTPATIENT
Start: 2025-03-27 | End: 2025-06-25

## 2025-03-27 RX ORDER — LORAZEPAM 0.5 MG/1
0.5 TABLET ORAL 3 TIMES DAILY PRN
Qty: 90 TABLET | Refills: 0 | Status: SHIPPED | OUTPATIENT
Start: 2025-03-27 | End: 2025-04-26

## 2025-03-31 ENCOUNTER — HOSPITAL ENCOUNTER (OUTPATIENT)
Dept: PREADMISSION TESTING | Age: 70
Discharge: HOME | End: 2025-04-04
Payer: MEDICARE

## 2025-03-31 VITALS
DIASTOLIC BLOOD PRESSURE: 61 MMHG | WEIGHT: 150 LBS | RESPIRATION RATE: 24 BRPM | HEIGHT: 73 IN | BODY MASS INDEX: 19.88 KG/M2 | OXYGEN SATURATION: 98 % | SYSTOLIC BLOOD PRESSURE: 96 MMHG | TEMPERATURE: 97.9 F | HEART RATE: 65 BPM

## 2025-03-31 LAB
ANION GAP SERPL CALCULATED.3IONS-SCNC: 11 MMOL/L (ref 9–16)
BASOPHILS # BLD: 0 K/UL (ref 0–0.2)
BASOPHILS NFR BLD: 1 % (ref 0–2)
BUN SERPL-MCNC: 29 MG/DL (ref 8–23)
CALCIUM SERPL-MCNC: 10.1 MG/DL (ref 8.6–10.4)
CHLORIDE SERPL-SCNC: 101 MMOL/L (ref 98–107)
CO2 SERPL-SCNC: 22 MMOL/L (ref 20–31)
CREAT SERPL-MCNC: 1.6 MG/DL (ref 0.7–1.2)
EOSINOPHIL # BLD: 0.1 K/UL (ref 0–0.4)
EOSINOPHILS RELATIVE PERCENT: 2 % (ref 0–4)
ERYTHROCYTE [DISTWIDTH] IN BLOOD BY AUTOMATED COUNT: 13.9 % (ref 11.5–14.9)
GFR, ESTIMATED: 46 ML/MIN/1.73M2
GLUCOSE SERPL-MCNC: 87 MG/DL (ref 74–99)
HCT VFR BLD AUTO: 37 % (ref 41–53)
HGB BLD-MCNC: 12.5 G/DL (ref 13.5–17.5)
LYMPHOCYTES NFR BLD: 0.5 K/UL (ref 1–4.8)
LYMPHOCYTES RELATIVE PERCENT: 11 % (ref 24–44)
MCH RBC QN AUTO: 31.6 PG (ref 26–34)
MCHC RBC AUTO-ENTMCNC: 33.7 G/DL (ref 31–37)
MCV RBC AUTO: 93.9 FL (ref 80–100)
MONOCYTES NFR BLD: 0.3 K/UL (ref 0.1–1.3)
MONOCYTES NFR BLD: 7 % (ref 1–7)
NEUTROPHILS NFR BLD: 79 % (ref 36–66)
NEUTS SEG NFR BLD: 3.7 K/UL (ref 1.3–9.1)
PLATELET # BLD AUTO: 235 K/UL (ref 150–450)
PMV BLD AUTO: 7.2 FL (ref 6–12)
POTASSIUM SERPL-SCNC: 4.2 MMOL/L (ref 3.7–5.3)
RBC # BLD AUTO: 3.95 M/UL (ref 4.5–5.9)
SODIUM SERPL-SCNC: 134 MMOL/L (ref 136–145)
WBC OTHER # BLD: 4.6 K/UL (ref 3.5–11)

## 2025-03-31 PROCEDURE — 36415 COLL VENOUS BLD VENIPUNCTURE: CPT

## 2025-03-31 PROCEDURE — 93005 ELECTROCARDIOGRAM TRACING: CPT | Performed by: ANESTHESIOLOGY

## 2025-03-31 PROCEDURE — 80048 BASIC METABOLIC PNL TOTAL CA: CPT

## 2025-03-31 PROCEDURE — APPSS45 APP SPLIT SHARED TIME 31-45 MINUTES: Performed by: NURSE PRACTITIONER

## 2025-03-31 PROCEDURE — 85025 COMPLETE CBC W/AUTO DIFF WBC: CPT

## 2025-03-31 ASSESSMENT — ENCOUNTER SYMPTOMS
BACK PAIN: 1
ABDOMINAL PAIN: 0
VOICE CHANGE: 0
DIARRHEA: 0
COUGH: 0
NAUSEA: 0
CONSTIPATION: 1
SHORTNESS OF BREATH: 1
TROUBLE SWALLOWING: 1
SORE THROAT: 0
VOMITING: 0

## 2025-03-31 NOTE — H&P
HISTORY and PHYSICAL  [facility]       NAME:  Devin Arthur  MRN: [id number]   YOB: 1955   Date: 3/31/2025   Age: 69 y.o.  Gender: male       COMPLAINT AND PRESENT HISTORY:     Devin Arthur is 69 y.o.  male, here for preadmission testing related to tonsillar cancer with scheduled PORT REMOVAL per Dr. Perez.     Pt initially diagnosed with tonsillar cancer in 2023. He was treated for oropharyngeal squamous cell carcinoma in [place] with radiation and reduced dose cisplatin. Pretreatment patient had feeding tube placed. Pt had experience pain with swallowing. Port to right anterior chest in place.     Patient reports his swallowing is still difficult after prostate surgery. He is having choking episodes where he has to drink liquids after meals to make sure it goes down. He did see a speech therapist in December. He is able to tolerate a regular diet and thin liquids. He now takes small bites, chews well, alternates solids with liquids, or liquid after a bite of food. He also avoids pop due to carbonation and spicy food due to causing discomfort on his tongue. Pt's peg tube was removed in August 2024 and he is drinking one Boost every day. Reports appetite has been improving. He reports that he has very little saliva production and uses Xylimetls as needed.     Prostate biopsy consistent with adenocarcinoma. He is currently pending robotic resection on November 1. Last pet imaging performed at the beginning of this month and was without evidence of head neck disease. Follows with urology.     PMHx includes:    Sees Dr. Dunn for PCP with last visit June, 2024.    Blood clots (remote history):  Associated medications include: none    Activity level:  Functional Capacity per patient:              1. Patient IS NOT able to walk 2 city blocks on level ground without SOB.              2. Patient IS NOT able to climb 2 flights of stairs without SOB.    Hx of prolonged emergence from anesthesia

## 2025-03-31 NOTE — H&P (VIEW-ONLY)
HISTORY and PHYSICAL  [facility]       NAME:  Devin Arthur  MRN: [id number]   YOB: 1955   Date: 3/31/2025   Age: 69 y.o.  Gender: male       COMPLAINT AND PRESENT HISTORY:     Devin Arthur is 69 y.o.  male, here for preadmission testing related to tonsillar cancer with scheduled PORT REMOVAL per Dr. Perez.     Pt initially diagnosed with tonsillar cancer in 2023. He was treated for oropharyngeal squamous cell carcinoma in [place] with radiation and reduced dose cisplatin. Pretreatment patient had feeding tube placed. Pt had experience pain with swallowing. Port to right anterior chest in place.     Patient reports his swallowing is still difficult after prostate surgery. He is having choking episodes where he has to drink liquids after meals to make sure it goes down. He did see a speech therapist in December. He is able to tolerate a regular diet and thin liquids. He now takes small bites, chews well, alternates solids with liquids, or liquid after a bite of food. He also avoids pop due to carbonation and spicy food due to causing discomfort on his tongue. Pt's peg tube was removed in August 2024 and he is drinking one Boost every day. Reports appetite has been improving. He reports that he has very little saliva production and uses Xylimetls as needed.     Prostate biopsy consistent with adenocarcinoma. He is currently pending robotic resection on November 1. Last pet imaging performed at the beginning of this month and was without evidence of head neck disease. Follows with urology.     PMHx includes:    Sees Dr. Dunn for PCP with last visit June, 2024.    Blood clots (remote history):  Associated medications include: none    Activity level:  Functional Capacity per patient:              1. Patient IS NOT able to walk 2 city blocks on level ground without SOB.              2. Patient IS NOT able to climb 2 flights of stairs without SOB.    Hx of prolonged emergence from anesthesia

## 2025-03-31 NOTE — DISCHARGE INSTRUCTIONS
Pre-op Instructions For Out-Patient Surgery    Medication Instructions:  Please stop herbs and any supplements now (includes vitamins and minerals).    For these medications:  Dulaglutide (Trulicity), Exenatide (Byetta and Bydureon, Liraglutide (Victoza), Lixisenatide (Adlyxin), Semaglutide (Ozempic and Rybelsus), Tirzepatide (Mounjaro, Zepbound)- Stop 1 week prior if taking weekly or 1 day prior if taking every 12 hours or daily. N/A    Please contact your surgeon and prescribing physician for pre-op instructions for any blood thinners. Diclofenac, Ibuprofen, and Aspirin     If you have inhalers/aerosol treatments at home, please use them the morning of your surgery and bring the inhalers with you to the hospital.    Please take the following medications the morning of your surgery with a sip of water:    Pantoprazole and Ativan     Surgery Instructions:  After midnight before surgery:  Do not eat or drink anything, including water, mints, gum, and hard candy.  You may brush your teeth without swallowing.  No smoking, chewing tobacco, or street drugs.    Please shower or bathe before surgery.  If you were given Surgical Scrub Chlorhexidine Gluconate Liquid (CHG), please shower the night before and the morning of your surgery following the detailed instructions you received during your pre-admission visit.     Please do not wear any cologne, lotion, powder, deodorant, jewelry, piercings, perfume, makeup, nail polish, hair accessories, or hair spray on the day of surgery.  Wear loose comfortable clothing.    Leave your valuables at home but bring a payment source for any after-surgery prescriptions you plan to fill at [facility].  Bring a storage case for any glasses/contacts.    An adult who is responsible for you MUST drive you home and should be with you for the first 24 hours after surgery.     If having out-patient knee and foot surgeries, please arrange for planned

## 2025-04-01 ENCOUNTER — HOSPITAL ENCOUNTER (OUTPATIENT)
Dept: NUCLEAR MEDICINE | Age: 70
Discharge: HOME | End: 2025-04-03
Attending: INTERNAL MEDICINE
Payer: MEDICARE

## 2025-04-01 DIAGNOSIS — C09.9 TONSILLAR CANCER (HCC): ICD-10-CM

## 2025-04-01 LAB — GLUCOSE BLD-MCNC: 95 MG/DL (ref 75–110)

## 2025-04-01 PROCEDURE — 3430000000 HC RX DIAGNOSTIC RADIOPHARMACEUTICAL: Performed by: INTERNAL MEDICINE

## 2025-04-01 PROCEDURE — 2500000003 HC RX 250 WO HCPCS: Performed by: INTERNAL MEDICINE

## 2025-04-01 PROCEDURE — 78815 PET IMAGE W/CT SKULL-THIGH: CPT

## 2025-04-01 PROCEDURE — A9609 HC RX DIAGNOSTIC RADIOPHARMACEUTICAL: HCPCS | Performed by: INTERNAL MEDICINE

## 2025-04-01 PROCEDURE — 82947 ASSAY GLUCOSE BLOOD QUANT: CPT

## 2025-04-01 RX ORDER — FLUDEOXYGLUCOSE F 18 200 MCI/ML
10 INJECTION, SOLUTION INTRAVENOUS
Status: COMPLETED | OUTPATIENT
Start: 2025-04-01 | End: 2025-04-01

## 2025-04-01 RX ORDER — SODIUM CHLORIDE 0.9 % (FLUSH) 0.9 %
10 SYRINGE (ML) INJECTION ONCE
Status: COMPLETED | OUTPATIENT
Start: 2025-04-01 | End: 2025-04-01

## 2025-04-01 RX ADMIN — FLUDEOXYGLUCOSE F 18 10 MILLICURIE: 200 INJECTION, SOLUTION INTRAVENOUS at 09:50

## 2025-04-01 RX ADMIN — SODIUM CHLORIDE, PRESERVATIVE FREE 10 ML: 5 INJECTION INTRAVENOUS at 09:50

## 2025-04-02 ENCOUNTER — HOSPITAL ENCOUNTER (OUTPATIENT)
Age: 70
Discharge: HOME | End: 2025-04-02
Payer: MEDICARE

## 2025-04-02 ENCOUNTER — RESULTS FOLLOW-UP (OUTPATIENT)
Dept: UROLOGY | Age: 70
End: 2025-04-02

## 2025-04-02 DIAGNOSIS — N39.46 URINARY INCONTINENCE, MIXED: ICD-10-CM

## 2025-04-02 DIAGNOSIS — C61 PROSTATE CANCER (HCC): ICD-10-CM

## 2025-04-02 LAB — PSA SERPL-MCNC: <0.03 NG/ML (ref 0–4)

## 2025-04-02 PROCEDURE — 84153 ASSAY OF PSA TOTAL: CPT

## 2025-04-02 PROCEDURE — 36415 COLL VENOUS BLD VENIPUNCTURE: CPT

## 2025-04-02 PROCEDURE — 87086 URINE CULTURE/COLONY COUNT: CPT

## 2025-04-03 LAB
MICROORGANISM SPEC CULT: NORMAL
SERVICE CMNT-IMP: NORMAL
SPECIMEN DESCRIPTION: NORMAL

## 2025-04-04 LAB
EKG ATRIAL RATE: 59 BPM
EKG P AXIS: 67 DEGREES
EKG P-R INTERVAL: 230 MS
EKG Q-T INTERVAL: 446 MS
EKG QRS DURATION: 110 MS
EKG QTC CALCULATION (BAZETT): 441 MS
EKG R AXIS: -7 DEGREES
EKG T AXIS: 59 DEGREES
EKG VENTRICULAR RATE: 59 BPM

## 2025-04-07 ENCOUNTER — OFFICE VISIT (OUTPATIENT)
Dept: UROLOGY | Age: 70
End: 2025-04-07
Payer: MEDICARE

## 2025-04-07 VITALS
HEART RATE: 74 BPM | OXYGEN SATURATION: 94 % | SYSTOLIC BLOOD PRESSURE: 126 MMHG | HEIGHT: 73 IN | BODY MASS INDEX: 19.88 KG/M2 | WEIGHT: 150 LBS | DIASTOLIC BLOOD PRESSURE: 78 MMHG | TEMPERATURE: 97.8 F

## 2025-04-07 DIAGNOSIS — R32 URINARY INCONTINENCE, UNSPECIFIED TYPE: ICD-10-CM

## 2025-04-07 DIAGNOSIS — Z90.79 HISTORY OF ROBOT-ASSISTED LAPAROSCOPIC RADICAL PROSTATECTOMY: ICD-10-CM

## 2025-04-07 DIAGNOSIS — C61 PROSTATE CANCER (HCC): Primary | ICD-10-CM

## 2025-04-07 DIAGNOSIS — N39.3 STRESS INCONTINENCE: Primary | ICD-10-CM

## 2025-04-07 PROCEDURE — 1123F ACP DISCUSS/DSCN MKR DOCD: CPT | Performed by: UROLOGY

## 2025-04-07 PROCEDURE — 99213 OFFICE O/P EST LOW 20 MIN: CPT | Performed by: UROLOGY

## 2025-04-07 PROCEDURE — 1159F MED LIST DOCD IN RCRD: CPT | Performed by: UROLOGY

## 2025-04-07 RX ORDER — MIRTAZAPINE 30 MG/1
TABLET, FILM COATED ORAL
COMMUNITY
Start: 2025-03-27

## 2025-04-07 ASSESSMENT — ENCOUNTER SYMPTOMS
COLOR CHANGE: 0
WHEEZING: 0
BACK PAIN: 0
VOMITING: 0
ABDOMINAL PAIN: 0
GASTROINTESTINAL NEGATIVE: 1
EYE PAIN: 0
ALLERGIC/IMMUNOLOGIC NEGATIVE: 1
RESPIRATORY NEGATIVE: 1
EYES NEGATIVE: 1
EYE REDNESS: 0
COUGH: 0
SHORTNESS OF BREATH: 0
NAUSEA: 0

## 2025-04-07 NOTE — PROGRESS NOTES
Review of Systems   Constitutional: Negative.  Negative for appetite change, chills and fever.   HENT: Negative.     Eyes: Negative.  Negative for pain, redness and visual disturbance.   Respiratory: Negative.  Negative for cough, shortness of breath and wheezing.    Cardiovascular: Negative.  Negative for chest pain and leg swelling.   Gastrointestinal: Negative.  Negative for abdominal pain, nausea and vomiting.   Endocrine: Negative.    Genitourinary: Negative.  Negative for difficulty urinating, dysuria, flank pain, frequency, hematuria, testicular pain and urgency.   Musculoskeletal: Negative.  Negative for back pain, joint swelling and myalgias.   Skin: Negative.  Negative for color change, rash and wound.   Allergic/Immunologic: Negative.    Neurological: Negative.  Negative for dizziness, tremors, weakness, numbness and headaches.   Hematological: Negative.  Negative for adenopathy. Does not bruise/bleed easily.   Psychiatric/Behavioral: Negative.       
for stress incontinence   Assessment & Plan   Return for Refer to Aury Warner for stress incontinence.    Prescriptions Ordered:  No orders of the defined types were placed in this encounter.    Orders Placed:  No orders of the defined types were placed in this encounter.          Douglas Silva MD    Agree with the ROS entered by the MA.

## 2025-04-08 ENCOUNTER — ANESTHESIA EVENT (OUTPATIENT)
Dept: OPERATING ROOM | Age: 70
End: 2025-04-08
Payer: MEDICARE

## 2025-04-08 ENCOUNTER — HOSPITAL ENCOUNTER (OUTPATIENT)
Dept: PHYSICAL THERAPY | Facility: CLINIC | Age: 70
Setting detail: THERAPIES SERIES
Discharge: HOME | End: 2025-04-08
Payer: MEDICARE

## 2025-04-08 ENCOUNTER — HOSPITAL ENCOUNTER (OUTPATIENT)
Dept: RADIATION ONCOLOGY | Age: 70
Discharge: HOME | End: 2025-04-08
Payer: MEDICARE

## 2025-04-08 VITALS
OXYGEN SATURATION: 98 % | RESPIRATION RATE: 16 BRPM | TEMPERATURE: 98.3 F | BODY MASS INDEX: 20.79 KG/M2 | SYSTOLIC BLOOD PRESSURE: 128 MMHG | HEART RATE: 56 BPM | DIASTOLIC BLOOD PRESSURE: 82 MMHG | WEIGHT: 157.6 LBS

## 2025-04-08 PROCEDURE — 97161 PT EVAL LOW COMPLEX 20 MIN: CPT

## 2025-04-08 PROCEDURE — 99212 OFFICE O/P EST SF 10 MIN: CPT | Performed by: RADIOLOGY

## 2025-04-08 PROCEDURE — 97530 THERAPEUTIC ACTIVITIES: CPT

## 2025-04-08 PROCEDURE — 97110 THERAPEUTIC EXERCISES: CPT

## 2025-04-08 NOTE — FLOWSHEET NOTE
[facility] Fall Risk Assessment    Patient Name:  Devin Arthur  : 1955    Risk Factor Scale  Score   History of Falls [] Yes  [x] No 25  0 0   Secondary Diagnosis [] Yes  [x] No 15  0 0   Ambulatory Aid [] Furniture  [] Crutches/cane/walker  [x] None/bedrest/wheelchair/nurse 30  15  0 0   IV/Heparin Lock [] Yes  [x] No 20  0 0   Gait/Transferring [] Impaired  [] Weak  [x] Normal/bedrest/immobile 20  10  0 0   Mental Status [] Forgets limitations  [x] Oriented to own ability 15  0 0      Total:0     Based on the Assessment score: check the appropriate box.    [x]  No intervention needed   Low =   Score of 0-24    []  Use standard prevention interventions Moderate =  Score of 24-44   [] Give patient handout and discuss fall prevention strategies   [] Establish goal of education for patient/family RE: fall prevention strategies    []  Use high risk prevention interventions High = Score of 45 and higher   [] Give patient handout and discuss fall prevention strategies   [] Establish goal of education for patient/family Re: fall prevention strategies   [] Discuss lifeline / other resources    Electronically signed by:   Aury Warner, PT  Date: 2025

## 2025-04-08 NOTE — PRE-PROCEDURE INSTRUCTIONS
No answer, left message ?       N/A                      Unable to leave message ?N/A    When were you told to arrive at hospital ?  1135    Do you have a  ? YES    Are you on any blood thinners ?  NO                   If yes when did you stop taking ?N/A    Do you have your prep Rx filled and instruction ?  N/A    Nothing to eat the day before , only clear liquids.N/A    Are you experiencing any covid symptoms ? NO    Do you have any infections or rash we should be aware of ?NO      Do you have the Hibiclens soap to use the night before and the morning of surgery ?YES    Nothing to eat or drink after midnight, only a sip of water to take any medication instructed to take the night before.INSTRUCTED  Wear comfortable clothing, leave any valuables at home, remove any jewelry and body piercing . INSTRUCTED    SPOKE WITH WIFE

## 2025-04-08 NOTE — CONSULTS
Moderate       []  High 20 1   []  Modalities       [x]  Ther Exercise 10 1   []  Manual Therapy       [x]  Ther Activities 15  1   []  Aquatics       []  Vasocompression       []  Other          TOTAL TREATMENT TIME: 45     Time in:1000   Time out: 1050     Electronically signed by: Aury Warner PT     Physician Signature:________________________________Date:__________________  By signing above or cosigning this note, I have reviewed this plan of care and certify a need for medically necessary rehabilitation services.      *PLEASE SIGN ABOVE AND FAX BACK ALL PAGES*

## 2025-04-08 NOTE — PROGRESS NOTES
Devin Arthur  4/8/2025  2:04 PM      Vitals:    04/08/25 1100   BP: 128/82   Pulse: 56   Resp: 16   Temp: 98.3 °F (36.8 °C)   SpO2: 98%    :     Pain Assessment: None - Denies Pain          Wt Readings from Last 1 Encounters:   04/08/25 71.5 kg (157 lb 9.6 oz)                Current Outpatient Medications:     mirtazapine (REMERON) 30 MG tablet, , Disp: , Rfl:     traZODone (DESYREL) 50 MG tablet, Take 1 tablet by mouth nightly, Disp: 30 tablet, Rfl: 0    LORazepam (ATIVAN) 0.5 MG tablet, Take 1 tablet by mouth 3 times daily as needed for Anxiety for up to 30 days. Max Daily Amount: 1.5 mg, Disp: 90 tablet, Rfl: 0    omeprazole (PRILOSEC) 20 MG delayed release capsule, Take 1 capsule by mouth daily, Disp: , Rfl:     ferrous sulfate (IRON 325) 325 (65 Fe) MG tablet, Take 1 tablet by mouth daily (with breakfast), Disp: 90 tablet, Rfl: 1    Pseudoephedrine HCl (SUDAFED PO), Take 30 mg by mouth as needed, Disp: , Rfl:     Xylitol (XYLIMELTS MT), Take by mouth as needed, Disp: , Rfl:     acetaminophen (TYLENOL) 325 MG tablet, Take 1 tablet by mouth every 6 hours as needed, Disp: , Rfl:     fluticasone (FLONASE) 50 MCG/ACT nasal spray, 2 sprays daily, Disp: , Rfl:     ibuprofen (ADVIL;MOTRIN) 200 MG tablet, Take 1 tablet by mouth every 6 hours as needed, Disp: , Rfl:     pantoprazole (PROTONIX) 20 MG tablet, Take 1 tablet by mouth, Disp: , Rfl:     diphenhydrAMINE (BENADRYL) 25 MG capsule, Take 1 capsule by mouth every 6 hours as needed for Itching, Disp: , Rfl:     Pseudoeph-Doxylamine-DM-APAP (NYQUIL PO), Take by mouth, Disp: , Rfl:     DICLOFENAC PO, Take by mouth as needed, Disp: , Rfl:        Hormone:  Lupron []   Last dose given:           Next dose due:   Eligard [x]   Last dose given: 12/4/24          Next dose due: 6/4/25  Anti-Estrogen Hormonal Therapy []   Medication name:   N/A:  []           FALLS RISK SCREEN  Instructions:  Assess the patient and enter the appropriate indicators that are present for fall risk

## 2025-04-09 ENCOUNTER — HOSPITAL ENCOUNTER (OUTPATIENT)
Age: 70
Setting detail: OUTPATIENT SURGERY
Discharge: HOME | End: 2025-04-09
Attending: SURGERY | Admitting: SURGERY
Payer: MEDICARE

## 2025-04-09 ENCOUNTER — ANESTHESIA (OUTPATIENT)
Dept: OPERATING ROOM | Age: 70
End: 2025-04-09
Payer: MEDICARE

## 2025-04-09 VITALS
SYSTOLIC BLOOD PRESSURE: 114 MMHG | DIASTOLIC BLOOD PRESSURE: 75 MMHG | TEMPERATURE: 97.2 F | OXYGEN SATURATION: 99 % | WEIGHT: 157 LBS | RESPIRATION RATE: 18 BRPM | HEART RATE: 54 BPM | HEIGHT: 73 IN | BODY MASS INDEX: 20.81 KG/M2

## 2025-04-09 PROCEDURE — 2709999900 HC NON-CHARGEABLE SUPPLY: Performed by: SURGERY

## 2025-04-09 PROCEDURE — 6360000002 HC RX W HCPCS: Performed by: NURSE PRACTITIONER

## 2025-04-09 PROCEDURE — 7100000000 HC PACU RECOVERY - FIRST 15 MIN: Performed by: SURGERY

## 2025-04-09 PROCEDURE — 7100000011 HC PHASE II RECOVERY - ADDTL 15 MIN: Performed by: SURGERY

## 2025-04-09 PROCEDURE — 6370000000 HC RX 637 (ALT 250 FOR IP): Performed by: ANESTHESIOLOGY

## 2025-04-09 PROCEDURE — 7100000001 HC PACU RECOVERY - ADDTL 15 MIN: Performed by: SURGERY

## 2025-04-09 PROCEDURE — 6360000002 HC RX W HCPCS: Performed by: ANESTHESIOLOGY

## 2025-04-09 PROCEDURE — 6360000002 HC RX W HCPCS: Performed by: NURSE ANESTHETIST, CERTIFIED REGISTERED

## 2025-04-09 PROCEDURE — 2580000003 HC RX 258: Performed by: ANESTHESIOLOGY

## 2025-04-09 PROCEDURE — 6360000002 HC RX W HCPCS: Performed by: SURGERY

## 2025-04-09 PROCEDURE — 7100000030 HC ASPR PHASE II RECOVERY - FIRST 15 MIN: Performed by: SURGERY

## 2025-04-09 PROCEDURE — 3600000012 HC SURGERY LEVEL 2 ADDTL 15MIN: Performed by: SURGERY

## 2025-04-09 PROCEDURE — 3700000000 HC ANESTHESIA ATTENDED CARE: Performed by: SURGERY

## 2025-04-09 PROCEDURE — 7100000010 HC PHASE II RECOVERY - FIRST 15 MIN: Performed by: SURGERY

## 2025-04-09 PROCEDURE — 7100000031 HC ASPR PHASE II RECOVERY - ADDTL 15 MIN: Performed by: SURGERY

## 2025-04-09 PROCEDURE — 3600000002 HC SURGERY LEVEL 2 BASE: Performed by: SURGERY

## 2025-04-09 PROCEDURE — 3700000001 HC ADD 15 MINUTES (ANESTHESIA): Performed by: SURGERY

## 2025-04-09 PROCEDURE — 36590 REMOVAL TUNNELED CV CATH: CPT | Performed by: SURGERY

## 2025-04-09 RX ORDER — SODIUM CHLORIDE 9 MG/ML
INJECTION, SOLUTION INTRAVENOUS PRN
Status: CANCELLED | OUTPATIENT
Start: 2025-04-09

## 2025-04-09 RX ORDER — ONDANSETRON 2 MG/ML
4 INJECTION INTRAMUSCULAR; INTRAVENOUS
Status: CANCELLED | OUTPATIENT
Start: 2025-04-09

## 2025-04-09 RX ORDER — PROPOFOL 10 MG/ML
INJECTION, EMULSION INTRAVENOUS
Status: DISCONTINUED | OUTPATIENT
Start: 2025-04-09 | End: 2025-04-09 | Stop reason: SDUPTHER

## 2025-04-09 RX ORDER — SODIUM CHLORIDE 0.9 % (FLUSH) 0.9 %
5-40 SYRINGE (ML) INJECTION EVERY 12 HOURS SCHEDULED
Status: CANCELLED | OUTPATIENT
Start: 2025-04-09

## 2025-04-09 RX ORDER — ONDANSETRON 2 MG/ML
INJECTION INTRAMUSCULAR; INTRAVENOUS
Status: DISCONTINUED | OUTPATIENT
Start: 2025-04-09 | End: 2025-04-09 | Stop reason: SDUPTHER

## 2025-04-09 RX ORDER — ACETAMINOPHEN 500 MG
1000 TABLET ORAL ONCE
Status: COMPLETED | OUTPATIENT
Start: 2025-04-09 | End: 2025-04-09

## 2025-04-09 RX ORDER — FENTANYL CITRATE 50 UG/ML
INJECTION, SOLUTION INTRAMUSCULAR; INTRAVENOUS
Status: DISCONTINUED | OUTPATIENT
Start: 2025-04-09 | End: 2025-04-09 | Stop reason: SDUPTHER

## 2025-04-09 RX ORDER — NALOXONE HYDROCHLORIDE 0.4 MG/ML
INJECTION, SOLUTION INTRAMUSCULAR; INTRAVENOUS; SUBCUTANEOUS PRN
Status: CANCELLED | OUTPATIENT
Start: 2025-04-09

## 2025-04-09 RX ORDER — SODIUM CHLORIDE 9 MG/ML
INJECTION, SOLUTION INTRAVENOUS PRN
Status: DISCONTINUED | OUTPATIENT
Start: 2025-04-09 | End: 2025-04-09 | Stop reason: HOSPADM

## 2025-04-09 RX ORDER — METOCLOPRAMIDE HYDROCHLORIDE 5 MG/ML
10 INJECTION INTRAMUSCULAR; INTRAVENOUS
Status: CANCELLED | OUTPATIENT
Start: 2025-04-09

## 2025-04-09 RX ORDER — SODIUM CHLORIDE 0.9 % (FLUSH) 0.9 %
5-40 SYRINGE (ML) INJECTION EVERY 12 HOURS SCHEDULED
Status: DISCONTINUED | OUTPATIENT
Start: 2025-04-09 | End: 2025-04-09 | Stop reason: HOSPADM

## 2025-04-09 RX ORDER — DEXAMETHASONE SODIUM PHOSPHATE 4 MG/ML
INJECTION, SOLUTION INTRA-ARTICULAR; INTRALESIONAL; INTRAMUSCULAR; INTRAVENOUS; SOFT TISSUE
Status: DISCONTINUED | OUTPATIENT
Start: 2025-04-09 | End: 2025-04-09 | Stop reason: SDUPTHER

## 2025-04-09 RX ORDER — MEPERIDINE HYDROCHLORIDE 25 MG/ML
12.5 INJECTION INTRAMUSCULAR; INTRAVENOUS; SUBCUTANEOUS EVERY 5 MIN PRN
Refills: 0 | Status: CANCELLED | OUTPATIENT
Start: 2025-04-09

## 2025-04-09 RX ORDER — MIDAZOLAM HYDROCHLORIDE 1 MG/ML
INJECTION, SOLUTION INTRAMUSCULAR; INTRAVENOUS
Status: DISCONTINUED | OUTPATIENT
Start: 2025-04-09 | End: 2025-04-09 | Stop reason: SDUPTHER

## 2025-04-09 RX ORDER — DIPHENHYDRAMINE HYDROCHLORIDE 50 MG/ML
12.5 INJECTION, SOLUTION INTRAMUSCULAR; INTRAVENOUS
Status: CANCELLED | OUTPATIENT
Start: 2025-04-09

## 2025-04-09 RX ORDER — SODIUM CHLORIDE, SODIUM LACTATE, POTASSIUM CHLORIDE, CALCIUM CHLORIDE 600; 310; 30; 20 MG/100ML; MG/100ML; MG/100ML; MG/100ML
INJECTION, SOLUTION INTRAVENOUS CONTINUOUS
Status: DISCONTINUED | OUTPATIENT
Start: 2025-04-09 | End: 2025-04-09 | Stop reason: HOSPADM

## 2025-04-09 RX ORDER — SODIUM CHLORIDE 0.9 % (FLUSH) 0.9 %
5-40 SYRINGE (ML) INJECTION PRN
Status: DISCONTINUED | OUTPATIENT
Start: 2025-04-09 | End: 2025-04-09 | Stop reason: HOSPADM

## 2025-04-09 RX ORDER — HYDRALAZINE HYDROCHLORIDE 20 MG/ML
10 INJECTION INTRAMUSCULAR; INTRAVENOUS
Status: CANCELLED | OUTPATIENT
Start: 2025-04-09

## 2025-04-09 RX ORDER — FENTANYL CITRATE 0.05 MG/ML
25 INJECTION, SOLUTION INTRAMUSCULAR; INTRAVENOUS EVERY 5 MIN PRN
Refills: 0 | Status: CANCELLED | OUTPATIENT
Start: 2025-04-09

## 2025-04-09 RX ORDER — LIDOCAINE HYDROCHLORIDE 10 MG/ML
1 INJECTION, SOLUTION EPIDURAL; INFILTRATION; INTRACAUDAL; PERINEURAL
Status: COMPLETED | OUTPATIENT
Start: 2025-04-09 | End: 2025-04-09

## 2025-04-09 RX ORDER — ACETAMINOPHEN 325 MG/1
650 TABLET ORAL
Status: CANCELLED | OUTPATIENT
Start: 2025-04-09

## 2025-04-09 RX ORDER — LIDOCAINE HYDROCHLORIDE 10 MG/ML
INJECTION, SOLUTION EPIDURAL; INFILTRATION; INTRACAUDAL; PERINEURAL
Status: DISCONTINUED | OUTPATIENT
Start: 2025-04-09 | End: 2025-04-09 | Stop reason: SDUPTHER

## 2025-04-09 RX ORDER — SODIUM CHLORIDE 0.9 % (FLUSH) 0.9 %
5-40 SYRINGE (ML) INJECTION PRN
Status: CANCELLED | OUTPATIENT
Start: 2025-04-09

## 2025-04-09 RX ORDER — LABETALOL HYDROCHLORIDE 5 MG/ML
10 INJECTION, SOLUTION INTRAVENOUS
Status: CANCELLED | OUTPATIENT
Start: 2025-04-09

## 2025-04-09 RX ORDER — GABAPENTIN 100 MG/1
100 CAPSULE ORAL ONCE
Status: COMPLETED | OUTPATIENT
Start: 2025-04-09 | End: 2025-04-09

## 2025-04-09 RX ADMIN — FENTANYL CITRATE 50 MCG: 50 INJECTION INTRAMUSCULAR; INTRAVENOUS at 13:00

## 2025-04-09 RX ADMIN — GABAPENTIN 100 MG: 100 CAPSULE ORAL at 11:37

## 2025-04-09 RX ADMIN — ACETAMINOPHEN 1000 MG: 500 TABLET ORAL at 11:37

## 2025-04-09 RX ADMIN — SODIUM CHLORIDE, POTASSIUM CHLORIDE, SODIUM LACTATE AND CALCIUM CHLORIDE: 600; 310; 30; 20 INJECTION, SOLUTION INTRAVENOUS at 11:51

## 2025-04-09 RX ADMIN — PROPOFOL 10 MG: 10 INJECTION, EMULSION INTRAVENOUS at 13:20

## 2025-04-09 RX ADMIN — MIDAZOLAM 1 MG: 1 INJECTION INTRAMUSCULAR; INTRAVENOUS at 12:52

## 2025-04-09 RX ADMIN — LIDOCAINE HYDROCHLORIDE 50 MG: 10 INJECTION, SOLUTION EPIDURAL; INFILTRATION; INTRACAUDAL; PERINEURAL at 13:00

## 2025-04-09 RX ADMIN — Medication 2000 MG: at 12:55

## 2025-04-09 RX ADMIN — MIDAZOLAM 1 MG: 1 INJECTION INTRAMUSCULAR; INTRAVENOUS at 12:54

## 2025-04-09 RX ADMIN — LIDOCAINE HYDROCHLORIDE 1 ML: 10 INJECTION, SOLUTION EPIDURAL; INFILTRATION; INTRACAUDAL; PERINEURAL at 11:51

## 2025-04-09 RX ADMIN — PROPOFOL 10 MG: 10 INJECTION, EMULSION INTRAVENOUS at 13:10

## 2025-04-09 RX ADMIN — PROPOFOL 20 MG: 10 INJECTION, EMULSION INTRAVENOUS at 13:03

## 2025-04-09 RX ADMIN — PROPOFOL 20 MG: 10 INJECTION, EMULSION INTRAVENOUS at 13:07

## 2025-04-09 RX ADMIN — PROPOFOL 10 MG: 10 INJECTION, EMULSION INTRAVENOUS at 13:16

## 2025-04-09 RX ADMIN — PROPOFOL 10 MG: 10 INJECTION, EMULSION INTRAVENOUS at 13:13

## 2025-04-09 RX ADMIN — ONDANSETRON 4 MG: 2 INJECTION, SOLUTION INTRAMUSCULAR; INTRAVENOUS at 13:05

## 2025-04-09 RX ADMIN — DEXAMETHASONE SODIUM PHOSPHATE 6 MG: 4 INJECTION INTRA-ARTICULAR; INTRALESIONAL; INTRAMUSCULAR; INTRAVENOUS; SOFT TISSUE at 13:05

## 2025-04-09 RX ADMIN — PROPOFOL 40 MG: 10 INJECTION, EMULSION INTRAVENOUS at 13:00

## 2025-04-09 ASSESSMENT — PAIN - FUNCTIONAL ASSESSMENT
PAIN_FUNCTIONAL_ASSESSMENT: NONE - DENIES PAIN
PAIN_FUNCTIONAL_ASSESSMENT: NONE - DENIES PAIN
PAIN_FUNCTIONAL_ASSESSMENT: 0-10

## 2025-04-09 NOTE — BRIEF OP NOTE
Brief Postoperative Note      Patient: Devin Arthur  YOB: 1955  MRN: [id number]    Date of Procedure: 4/9/2025    Pre-Op Diagnosis Codes:      * Cancer of tonsil (HCC) [C09.9]    Post-Op Diagnosis: Same       Procedure(s):  PORT REMOVAL    Surgeon(s):  Jay Perez MD    Assistant:  * No surgical staff found *    Anesthesia: General    Estimated Blood Loss (mL): Minimal    Complications: None    Specimens:   * No specimens in log *    Implants:  * No implants in log *      Drains: * No LDAs found *    Findings:  Infection Present At Time Of Surgery (PATOS) (choose all levels that have infection present):  No infection present  Other Findings: dictated    Electronically signed by Jay Perez MD on 4/9/2025 at 1:26 PM

## 2025-04-09 NOTE — ANESTHESIA POSTPROCEDURE EVALUATION
Department of Anesthesiology  Postprocedure Note    Patient: Devin Arthur  MRN: [id number]  YOB: 1955  Date of evaluation: 4/9/2025    Procedure Summary       Date: 04/09/25 Room / Location: [address]    Anesthesia Start: 1252 Anesthesia Stop: 1333    Procedure: PORT REMOVAL (Right: Chest) Diagnosis:       Cancer of tonsil (HCC)      (Cancer of tonsil (HCC) [C09.9])    Surgeons: Jay Perez MD Responsible Provider: Phillip Tran MD    Anesthesia Type: General ASA Status: 2            Anesthesia Type: General    Romina Phase I: Romina Score: 7    Romina Phase II: Romina Score: 10    Anesthesia Post Evaluation    Comments: POST- ANESTHESIA EVALUATION       Pt Name: Devin Arthur  MRN: [id number]  YOB: 1955  Date of evaluation: 4/9/2025  Time:  3:12 PM      /75   Pulse 54   Temp 97.2 °F (36.2 °C) (Infrared)   Resp 18   Ht 1.854 m (6' 0.99\")   Wt 71.2 kg (157 lb)   SpO2 99%   BMI 20.72 kg/m²      Consciousness Level  Awake  Cardiopulmonary Status  Stable  Pain Adequately Treated YES  Nausea / Vomiting  NO  Adequate Hydration  YES  Anesthesia Related Complications NONE      Electronically signed by Phillip Tran MD on 4/9/2025 at 3:12 PM           No notable events documented.

## 2025-04-09 NOTE — ANESTHESIA PRE PROCEDURE
Department of Anesthesiology  Preprocedure Note       Name:  Devin Arthur   Age:  69 y.o.  :  1955                                          MRN:  [id number]         Date:  2025      Surgeon: Surgeon(s):  Jay Perez MD    Procedure: Procedure(s):  PORT REMOVAL    Medications prior to admission:   Prior to Admission medications    Medication Sig Start Date End Date Taking? Authorizing Provider   mirtazapine (REMERON) 30 MG tablet  3/27/25  Yes Santo Wheatley MD   traZODone (DESYREL) 50 MG tablet Take 1 tablet by mouth nightly 3/27/25 6/25/25 Yes Evie Tejeda APRN - CNP   LORazepam (ATIVAN) 0.5 MG tablet Take 1 tablet by mouth 3 times daily as needed for Anxiety for up to 30 days. Max Daily Amount: 1.5 mg 3/27/25 4/26/25 Yes Evie Tejeda APRN - CNP   Pseudoephedrine HCl (SUDAFED PO) Take 30 mg by mouth as needed   Yes Provider, Historical, MD   Xylitol (XYLIMELTS MT) Take by mouth as needed   Yes Provider, MD Santo   acetaminophen (TYLENOL) 325 MG tablet Take 1 tablet by mouth every 6 hours as needed   Yes Provider, MD Santo   fluticasone (FLONASE) 50 MCG/ACT nasal spray 2 sprays daily 3/16/24 4/9/25 Yes ProviderSanto MD   ibuprofen (ADVIL;MOTRIN) 200 MG tablet Take 1 tablet by mouth every 6 hours as needed   Yes Provider, MD Santo   pantoprazole (PROTONIX) 20 MG tablet Take 1 tablet by mouth   Yes Provider, MD Santo   diphenhydrAMINE (BENADRYL) 25 MG capsule Take 1 capsule by mouth every 6 hours as needed for Itching   Yes ProviderSanto MD   Pseudoeph-Doxylamine-DM-APAP (NYQUIL PO) Take by mouth   Yes ProviderSanto MD   DICLOFENAC PO Take by mouth as needed   Yes ProviderSanto MD   ferrous sulfate (IRON 325) 325 (65 Fe) MG tablet Take 1 tablet by mouth daily (with breakfast)  Patient not taking: Reported on 2025 10/11/24   Chin Atkinson MD       Current medications:    Current Facility-Administered Medications   Medication

## 2025-04-09 NOTE — INTERVAL H&P NOTE
Update History & Physical    The patient's History and Physical of   March 31, 2025    Patient will be having : Procedure(s):  PORT REMOVAL      There was  no change. Or updates at this time.     Patient did not require any medical clearance.    Blood thinners/ Anticoagulant:  no    Patient denies any personal or family problems with anesthesia.    Patient was physically assessed, including cardiovascular and respiratory. Denies any chest pain or SOB. Denies any recent URI, no fever or chills.     Patient understands and wants to proceed with the procedure.     ./88   Pulse 60   Temp 97.2 °F (36.2 °C) (Tympanic)   Resp 18   Ht 1.854 m (6' 0.99\")   Wt 71.2 kg (157 lb)   SpO2 99%   BMI 20.72 kg/m²       Electronically signed by ALONZO GASPAR CNP on 4/9/2025 at 11:55 AM      Note marked for cosign by provider

## 2025-04-09 NOTE — OP NOTE
[facility] General Surgery   Jay Perez MD, FACS  Melodie ALARCONEufemia Pancho, APRN-CNP  [address], [address]  [place] [address]  P: [phone], F: [phone]      Preoperative diagnosis: History of tonsillar cancer status post treatment.  Port-A-Cath no longer required    Postoperative diagnosis: Same    Procedure: Removal of Port-A-Cath    Surgeon: Dr. Perez    First Assist: None    Anesthesia: MAC/local    Preparation: Chloraprep    EBL: Less than 10 mL    Specimen: None    Procedure: Informed consent was obtained.  Preoperative antibiotic was given.  Patient was taken to the operating room.  Intravenous anesthesia was given.  Vital signs were monitored remained stable throughout the procedure.  Operative site was prepped and draped in usual sterile fashion.  Timeout was done.  Local anesthetic was infiltrated at the site of previous scar.  Incision was made using a #15 blade.  Incision was deepened with the help of Bovie cautery.  Dissection was carried out.  Port was freed up.  Port and the catheter was removed intact.  Catheter tract was closed using Vicryl suture.  Complete hemostasis was confirmed.  Sponge needle instrument counts found to be correct.  After confirming hemostasis sponge needle instrument count wound was approximated dissing absorbable sutures.  Site was cleaned.  Dermabond was applied.  Steri-Strips were applied.  Sterile dressing was applied.  Patient tolerated procedure well and was transferred to the recovery room in a stable condition.    Recommendations: Findings discussed with the family.  Postoperative course recovery restrictions discussed.  Follow-up in the office as needed.

## 2025-04-10 ENCOUNTER — TELEPHONE (OUTPATIENT)
Dept: UROLOGY | Age: 70
End: 2025-04-10

## 2025-04-10 NOTE — TELEPHONE ENCOUNTER
45 mg eligard administered 12/4/24 so wont be able to get until June 4th, 2025.     Is he leaving for [place] again?

## 2025-04-10 NOTE — TELEPHONE ENCOUNTER
Mrs. Arthur, called and stated that they seen the Radiation oncologist and suggestion was for him to have another hormone shot to help heal up before he have radiation. Mrs. Arthur stated they he had one back on 12/4/24, and would like to know if he had to wait the full 6 months because of insurance reason or could he have another one sooner.

## 2025-04-11 ENCOUNTER — TELEPHONE (OUTPATIENT)
Dept: ONCOLOGY | Age: 70
End: 2025-04-11

## 2025-04-11 NOTE — TELEPHONE ENCOUNTER
I have called Mr. Arthur, and informed him that Audrey Ellington, ALONZO - JEANNINE stated that because last 45 mg eligard administered 12/4/24 he will not be able to get another one until June 4th, 2025.  Patient understood call ended.

## 2025-04-11 NOTE — TELEPHONE ENCOUNTER
Name: Devin Arthur  : 1955  MRN: [id number]    Oncology Navigation Follow-Up Note    Contact Type:  Telephone    Notes: Writer received a call from pt asking for the name and does of his ADT injection as they are going to [place] at end of May and wont be in the area when his next dose is due. Writer provided pt with info reguested and pt appreciative. Will continue to follow.      Electronically signed by Sarah Vyas RN on 2025 at 3:18 PM

## 2025-04-14 ENCOUNTER — HOSPITAL ENCOUNTER (OUTPATIENT)
Dept: PHYSICAL THERAPY | Facility: CLINIC | Age: 70
Setting detail: THERAPIES SERIES
Discharge: HOME | End: 2025-04-14
Payer: MEDICARE

## 2025-04-14 PROCEDURE — 97530 THERAPEUTIC ACTIVITIES: CPT

## 2025-04-14 PROCEDURE — 90912 BFB TRAINING 1ST 15 MIN: CPT

## 2025-04-14 PROCEDURE — 97032 APPL MODALITY 1+ESTIM EA 15: CPT

## 2025-04-15 ENCOUNTER — OFFICE VISIT (OUTPATIENT)
Dept: ONCOLOGY | Age: 70
End: 2025-04-15
Payer: MEDICARE

## 2025-04-15 ENCOUNTER — CLINICAL DOCUMENTATION (OUTPATIENT)
Dept: ONCOLOGY | Age: 70
End: 2025-04-15

## 2025-04-15 VITALS
TEMPERATURE: 97.3 F | SYSTOLIC BLOOD PRESSURE: 122 MMHG | OXYGEN SATURATION: 99 % | HEART RATE: 68 BPM | DIASTOLIC BLOOD PRESSURE: 85 MMHG | RESPIRATION RATE: 16 BRPM | BODY MASS INDEX: 20.71 KG/M2 | WEIGHT: 157 LBS

## 2025-04-15 DIAGNOSIS — C09.9 TONSILLAR CANCER (HCC): Primary | ICD-10-CM

## 2025-04-15 DIAGNOSIS — D50.0 IRON DEFICIENCY ANEMIA DUE TO CHRONIC BLOOD LOSS: ICD-10-CM

## 2025-04-15 DIAGNOSIS — C61 PROSTATE CANCER (HCC): ICD-10-CM

## 2025-04-15 PROCEDURE — 1159F MED LIST DOCD IN RCRD: CPT | Performed by: INTERNAL MEDICINE

## 2025-04-15 PROCEDURE — 99204 OFFICE O/P NEW MOD 45 MIN: CPT | Performed by: INTERNAL MEDICINE

## 2025-04-15 PROCEDURE — 1160F RVW MEDS BY RX/DR IN RCRD: CPT | Performed by: INTERNAL MEDICINE

## 2025-04-15 PROCEDURE — 99211 OFF/OP EST MAY X REQ PHY/QHP: CPT | Performed by: INTERNAL MEDICINE

## 2025-04-15 PROCEDURE — 1123F ACP DISCUSS/DSCN MKR DOCD: CPT | Performed by: INTERNAL MEDICINE

## 2025-04-15 PROCEDURE — 1126F AMNT PAIN NOTED NONE PRSNT: CPT | Performed by: INTERNAL MEDICINE

## 2025-04-15 NOTE — PROGRESS NOTES
97.3 °F (36.3 °C)   SpO2: 99%         PHYSICAL EXAM:   General appearance - well appearing, no in pain or distress   Mental status - alert and cooperative   Eyes - pupils equal and reactive, extraocular eye movements intact   Ears - bilateral TM's and external ear canals normal   Mouth - mucous membranes moist, pharynx normal without lesions   Neck - supple, no significant adenopathy   Lymphatics - no palpable lymphadenopathy, no hepatosplenomegaly   Chest - clear to auscultation, no wheezes, rales or rhonchi, symmetric air entry   Heart - normal rate, regular rhythm, normal S1, S2, no murmurs, rubs, clicks or gallops   Abdomen - soft, nontender, nondistended, no masses or organomegaly   Neurological - alert, oriented, normal speech, no focal findings or movement disorder noted   Musculoskeletal - no joint tenderness, deformity or swelling   Extremities - peripheral pulses normal, no pedal edema, no clubbing or cyanosis   Skin - normal coloration and turgor, no rashes, no suspicious skin lesions noted ,      LABORATORY DATA:     Lab Results   Component Value Date    WBC 4.6 03/31/2025    HGB 12.5 (L) 03/31/2025    HCT 37.0 (L) 03/31/2025    MCV 93.9 03/31/2025     03/31/2025    LYMPHOPCT 11 (L) 03/31/2025    RBC 3.95 (L) 03/31/2025    MCH 31.6 03/31/2025    MCHC 33.7 03/31/2025    RDW 13.9 03/31/2025    NEUTOPHILPCT 79 (H) 03/31/2025    MONOPCT 7 03/31/2025    EOSPCT 2 03/31/2025    BASOPCT 1 03/31/2025    NEUTROABS 3.70 03/31/2025    LYMPHSABS 0.50 (L) 03/31/2025    MONOSABS 0.30 03/31/2025    EOSABS 0.10 03/31/2025    BASOSABS 0.00 03/31/2025         Chemistry        Component Value Date/Time     (L) 03/31/2025 1109    K 4.2 03/31/2025 1109     03/31/2025 1109    CO2 22 03/31/2025 1109    BUN 29 (H) 03/31/2025 1109    CREATININE 1.6 (H) 03/31/2025 1109        Component Value Date/Time    CALCIUM 10.1 03/31/2025 1109    ALKPHOS 144 (H) 11/13/2024 1344    AST 33 11/13/2024 1344    ALT 32

## 2025-04-15 NOTE — PROGRESS NOTES
[facility]- MEDICAL NUTRITION THERAPY     Visit Type:Follow-up     NUTRITION RECOMMENDATIONS / MONITORING / EVALUATION  Encouraged soft, moist diet as tolerated.   Continue use of oral nutrition supplements to help meet nutritional needs/maintain weight.  Will continue to monitor PO tolerance, adequacy of intake, weight, and care plans.    Subjective/Current Data:  Devin Arthur is a 69 y.o. male with PMH as listed below.   Chart reviewed and met with patient and spouse after visit with oncologist. Pt c/o dry mouth and swallowing difficulty, especially with breads and crackers. Pt reports he tolerates moist foods well, but doesn't care for addition of gravies and sauces to foods. Noted esophagram ordered by Dr Luna at visit this morning.   Pt continues to use oral nutrition supplements; currently drinking 1-2 cans of Boost VHC daily. C/o constipation and is wondering if protein shakes could be the cause. ONS can cause/contribute to constipation. Discussed possible trial of another supplement, adequate fluid intake, and use of stool softener. Pt states he currently uses laxatives as needed.    Objective Data:  Patient Active Problem List    Diagnosis Date Noted    Cancer of tonsil (HCC) 12/10/2024    Prostate cancer (HCC) 11/01/2024    Prostate CA (HCC) 11/01/2024    Insomnia due to medical condition 07/31/2024    Squamous cell carcinoma of tonsil 07/31/2024    Episode of recurrent major depressive disorder 07/31/2024    Benign prostatic hyperplasia with lower urinary tract symptoms 07/31/2024    Generalized anxiety disorder 07/31/2024    Tonsillar cancer (HCC) 06/28/2024     Anthropometric Measures:  Height: 6' 1\"  Weight: 157 lb (71.2 kg)    Ideal Body Weight: 184 lb (83.6 kg)  BMI: 20.71 kg/m2 (Normal Weight)     Wt Readings from Last 20 Encounters:   04/15/25 71.2 kg (157 lb)   04/15/25 71.3 kg (157 lb 3.2 oz)   04/09/25 71.2 kg (157 lb)   04/08/25 71.5 kg (157 lb 9.6 oz)   04/07/25 68 kg (150 lb)

## 2025-04-17 DIAGNOSIS — J01.90 ACUTE NON-RECURRENT SINUSITIS, UNSPECIFIED LOCATION: Primary | ICD-10-CM

## 2025-04-17 RX ORDER — AMOXICILLIN 875 MG/1
875 TABLET, COATED ORAL 2 TIMES DAILY
Qty: 20 TABLET | Refills: 0 | Status: SHIPPED | OUTPATIENT
Start: 2025-04-17 | End: 2025-04-27

## 2025-04-17 NOTE — FLOWSHEET NOTE
[] [facility]  Outpatient Rehabilitation &  Therapy  [address]  P: [phone]  F: [phone] [x] [facility]  Outpatient Rehabilitation &  Therapy  [address]  P: [phone]  F: [phone]     Physical Therapy Daily Treatment Note    Date:  2025  Patient Name:  Devin Arthur    :  1955  MRN: [id number]  Physician: Douglas Silva                                                               Insurance: [facility] Medicare Advantage (Auth after eval, vs per [place], $30 co-pay per vs)  Medical Diagnosis: Stress Incontinence N39.3                 Rehab Codes: M62.50, N39.3  Onset Date: 24                                  Next 's appt: 10/6/2025     Visit# / total visits: ; Progress note for Medicare patient due at visit 10     Cancels/No Shows:     Subjective:    Pain:  [] Yes  [x] No Location:  N/A Pain Rating: (0-10 scale) 0/10  Pain altered Tx:  [x] No  [] Yes  Action:  Comments: Pt states he thinks he has been doing HEP correctly. Trying to get in all his exercise sets.    Objective:  Modalities:   Precautions [x] No  [] Yes:   Exercises:  Exercise Reps/ Time Weight/ Level Comments   Male PF pic explanation [x]     Review   Urine stop test  [x]     Review   PF ex: short holds  40-50x  1-2 sec     PF ex: long holds  40-50x  10 x 5 sec     The knack ex  [x]     Review             Other:     Treatment Charges: Mins Units   [x]  Modalities: PF ES 15 1   []  Ther Exercise     []  Neuromuscular Re-ed     []  Gait Training     []  Manual Therapy     [x]  Ther Activities 10 1   []  Aquatics     []  Vasocompression     []  Cervical Traction     [x]  Other: BFB 15 1   Total Billable time 40 min 3      Assessment: [x] Progressing toward goals. Hooked pt up to biofeedback with internal rectal sensor placed per the PT with pt consent. Noted slightly elevated resting tone. Slow initial contraction.Hold time of about 3-4 seconds then

## 2025-04-21 ENCOUNTER — HOSPITAL ENCOUNTER (OUTPATIENT)
Dept: PHYSICAL THERAPY | Facility: CLINIC | Age: 70
Setting detail: THERAPIES SERIES
Discharge: HOME | End: 2025-04-21
Payer: MEDICARE

## 2025-04-21 PROCEDURE — 97530 THERAPEUTIC ACTIVITIES: CPT

## 2025-04-21 PROCEDURE — 97110 THERAPEUTIC EXERCISES: CPT

## 2025-04-21 PROCEDURE — 97140 MANUAL THERAPY 1/> REGIONS: CPT

## 2025-04-21 NOTE — FLOWSHEET NOTE
[] [facility]  Outpatient Rehabilitation &  Therapy  [address]  [phone]  [phone] [] [facility]  Outpatient Rehabilitation &  Therapy  [address]  P: [phone]  F: [phone] [x] [facility]  Outpatient Rehabilitation &  Therapy  [address]  P: [phone]  F: [phone] [] [facility]  Outpatient Rehabilitation &  Therapy  [address]  [phone]  [phone] [] [facility]  Outpatient Rehabilitation &  Therapy  [address]   P: [phone]  F: [phone]  [] [facility]  Outpatient Rehabilitation &  Therapy  [address]  P: [phone]  F: [phone] [] [facility]  Outpatient Rehabilitation &  Therapy  [address]  P: [phone]  F: [phone] [] [facility]  Outpatient Rehabilitation &  Therapy  [address]  P: [phone]  F: [phone] [] [facility]  Outpatient Rehabilitation &  Therapy  [address]  P: [phone]  F: [phone]  [] [facility] Outpatient Rehabilitation &  Therapy  [address]  P: [phone]  F: [phone]     Physical Therapy Daily Treatment Note    Date:  2025  Patient Name:  Devin Arthur    :  1955  MRN: [id number]  Physician: Chin Atkinson MD                   Insurance: [facility] MEDICARE ADV $20 co pay AUTH AFTER EVAL, 18vs of PT from 24-3/24/25 auth#[id number]   Medical Diagnosis: Tonsillar cancer            Rehab Codes: C09.9, R59.0, 25.611, 25.612, R53.0, L90.5, R29.3   Onset date: 24 script                            Next 's appt.: ongoing  PVisit# / total visits:     Cancels/No Shows: 0    Subjective:  Pt returns from 3 month trip to [place], states he walked his dog but wasn't able to do HEP as much 2

## 2025-04-22 NOTE — PROGRESS NOTES
[] [facility]  Outpatient Rehabilitation &  Therapy  [address]  [phone]  [phone] [] [facility]  Outpatient Rehabilitation &  Therapy  [address]  P: [phone]  F: [phone] [x] [facility]  Outpatient Rehabilitation &  Therapy  [address]  P: [phone]  F: [phone] [] [facility]  Outpatient Rehabilitation &  Therapy  [address]  [phone]  [phone] [] [facility]  Outpatient Rehabilitation &  Therapy  [address]   P: [phone]  F: [phone]  [] [facility]  Outpatient Rehabilitation &  Therapy  [address]  P: [phone]  F: [phone] [] [facility]  Outpatient Rehabilitation &  Therapy  [address]  P: [phone]  F: [phone] [] [facility]  Outpatient Rehabilitation &  Therapy  [address]  P: [phone]  F: [phone] [] [facility]  Outpatient Rehabilitation &  Therapy  [address]  P: [phone]  F: [phone]  [] [facility] Outpatient Rehabilitation &  Therapy  [address]  P: [phone]  F: [phone]     Physical Therapy Progress Note    Date: 2025      Patient: Devin Arthur  : 1955  MRN: [id number]    Physician: Chin Atkinson MD                   Insurance: [facility] MEDICARE ADV $20 co pay AUTH AFTER EVAL, 18vs of PT from 24-3/24/25 auth#[id number]   Medical Diagnosis: Tonsillar cancer            Rehab Codes: C09.9, R59.0, 25.611, 25.612, R53.0, L90.5, R29.3   Onset date: 24 script                            Next Dr's appt.: ongoing  PVisit# / total visits:                     Cancels/No Shows: 0     Subjective:  Pt returns from 3 month trip to [place], states he walked his dog but wasn't able to do HEP as much 2

## 2025-04-27 DIAGNOSIS — G47.01 INSOMNIA DUE TO MEDICAL CONDITION: ICD-10-CM

## 2025-04-28 ENCOUNTER — HOSPITAL ENCOUNTER (OUTPATIENT)
Dept: PHYSICAL THERAPY | Facility: CLINIC | Age: 70
Setting detail: THERAPIES SERIES
Discharge: HOME | End: 2025-04-28
Payer: MEDICARE

## 2025-04-28 PROCEDURE — 97032 APPL MODALITY 1+ESTIM EA 15: CPT

## 2025-04-28 PROCEDURE — 97530 THERAPEUTIC ACTIVITIES: CPT

## 2025-04-28 PROCEDURE — 90912 BFB TRAINING 1ST 15 MIN: CPT

## 2025-04-28 PROCEDURE — 97110 THERAPEUTIC EXERCISES: CPT

## 2025-04-28 PROCEDURE — 97140 MANUAL THERAPY 1/> REGIONS: CPT

## 2025-04-28 RX ORDER — TRAZODONE HYDROCHLORIDE 50 MG/1
50 TABLET ORAL NIGHTLY
Qty: 30 TABLET | Refills: 0 | Status: SHIPPED | OUTPATIENT
Start: 2025-04-28 | End: 2025-05-28

## 2025-04-28 NOTE — FLOWSHEET NOTE
[] [facility]  Outpatient Rehabilitation &  Therapy  [address]  P: [phone]  F: [phone] [x] [facility]  Outpatient Rehabilitation &  Therapy  [address]  P: [phone]  F: [phone]     Physical Therapy Daily Treatment Note    Date:  2025  Patient Name:  Devin Arthur    :  1955  MRN: [id number]  Physician: Douglas Silva                                                               Insurance: [facility] Medicare Advantage (Auth after eval, vs per [place], $30 co-pay per vs)Approved 6 visits 4.8.25-63 , medical review for remaining 6 visits   Medical Diagnosis: Stress Incontinence N39.3                 Rehab Codes: M62.50, N39.3  Onset Date: 24                                  Next 's appt: 10/6/2025     Visit# / total visits: 3/12; Progress note for Medicare patient due at visit 10     Cancels/No Shows:     Subjective:    Pain:  [] Yes  [x] No Location:  N/A Pain Rating: (0-10 scale) 0/10  Pain altered Tx:  [x] No  [] Yes  Action:  Comments: Pt thinks his leaks are less and states he is doing HEP daily.    Objective:  Modalities:   Precautions [x] No  [] Yes:   Exercises:  Exercise Reps/ Time Weight/ Level Comments   Male PF pic explanation [x]     Review   Urine stop test  [x]     Review   PF ex: short holds  40-50x  1-2 sec     PF ex: long holds  40-50x  10 x 5 sec     The knack ex  [x]     Review             Other:     Treatment Charges: Mins Units   [x]  Modalities: PF ES 15 1   []  Ther Exercise     []  Neuromuscular Re-ed     []  Gait Training     []  Manual Therapy     [x]  Ther Activities 10 1   []  Aquatics     []  Vasocompression     []  Cervical Traction     [x]  Other: BFB 15 1   Total Billable time 40 min 3      Assessment: [x] Progressing toward goals. Hooked pt up to biofeedback with internal rectal sensor placed per the PT with pt consent. Noted slightly elevated resting tone. Improved initial

## 2025-04-28 NOTE — FLOWSHEET NOTE
[] [facility]  Outpatient Rehabilitation &  Therapy  [address]  [phone]  [phone] [] [facility]  Outpatient Rehabilitation &  Therapy  [address]  P: [phone]  F: [phone] [x] [facility]  Outpatient Rehabilitation &  Therapy  [address]  P: [phone]  F: [phone] [] [facility]  Outpatient Rehabilitation &  Therapy  [address]  [phone]  [phone] [] [facility]  Outpatient Rehabilitation &  Therapy  [address]   P: [phone]  F: [phone]  [] [facility]  Outpatient Rehabilitation &  Therapy  [address]  P: [phone]  F: [phone] [] [facility]  Outpatient Rehabilitation &  Therapy  [address]  P: [phone]  F: [phone] [] [facility]  Outpatient Rehabilitation &  Therapy  [address]  P: [phone]  F: [phone] [] [facility]  Outpatient Rehabilitation &  Therapy  [address]  P: [phone]  F: [phone]  [] [facility] Outpatient Rehabilitation &  Therapy  [address]  P: [phone]  F: [phone]     Physical Therapy Daily Treatment Note    Date:  2025  Patient Name:  Devin Arthur    :  1955  MRN: [id number]  Physician: Chin Atkinson MD                   Insurance: [facility] MEDICARE ADV $20 co pay AUTH AFTER EVAL, 18vs of PT from 24-3/24/25 auth#[id number].  12 addtl vs from -25 auth#[id number].   Medical Diagnosis: Tonsillar cancer            Rehab Codes: C09.9, R59.0, 25.611, 25.612, R53.0, L90.5, R29.3   Onset date: 24 script                            Next Dr's appt.: ongoing  PVisit# / total visits: 3/30 ( ends 25)    Cancels/No Shows: 0    Subjective:  Pt coming from pelvic floor PT, notes he is

## 2025-04-30 ENCOUNTER — OFFICE VISIT (OUTPATIENT)
Dept: PALLATIVE CARE | Age: 70
End: 2025-04-30
Payer: MEDICARE

## 2025-04-30 VITALS
HEART RATE: 71 BPM | SYSTOLIC BLOOD PRESSURE: 120 MMHG | RESPIRATION RATE: 18 BRPM | TEMPERATURE: 97.5 F | DIASTOLIC BLOOD PRESSURE: 86 MMHG | BODY MASS INDEX: 21.39 KG/M2 | WEIGHT: 162.1 LBS | OXYGEN SATURATION: 100 %

## 2025-04-30 DIAGNOSIS — C61 PROSTATE CANCER (HCC): ICD-10-CM

## 2025-04-30 DIAGNOSIS — C09.9 SQUAMOUS CELL CARCINOMA OF TONSIL (HCC): ICD-10-CM

## 2025-04-30 DIAGNOSIS — G47.01 INSOMNIA DUE TO MEDICAL CONDITION: ICD-10-CM

## 2025-04-30 DIAGNOSIS — F41.1 GENERALIZED ANXIETY DISORDER: ICD-10-CM

## 2025-04-30 DIAGNOSIS — C09.9 TONSILLAR CANCER (HCC): Primary | ICD-10-CM

## 2025-04-30 PROCEDURE — 1159F MED LIST DOCD IN RCRD: CPT

## 2025-04-30 PROCEDURE — 99214 OFFICE O/P EST MOD 30 MIN: CPT

## 2025-04-30 PROCEDURE — 1123F ACP DISCUSS/DSCN MKR DOCD: CPT

## 2025-04-30 RX ORDER — LORAZEPAM 0.5 MG/1
0.5 TABLET ORAL EVERY 8 HOURS PRN
Qty: 90 TABLET | Refills: 0 | Status: SHIPPED | OUTPATIENT
Start: 2025-04-30 | End: 2025-05-30

## 2025-04-30 NOTE — PROGRESS NOTES
both he and his wife. He has tried nyquil and benadryl. He also has tried a short course of marijuana gummies. Nothing has seemed to help.     He also has started following with a psychiatrist, who has placed him on remeron. The dose of this is being adjusted even as recently as last night. He was previously on wellbutrin, but this was not effective. He tells me that his anxiety and tearfullness can come on \"out of nowhere.\" Speaking to it in my office, he broke down in tears several times. He has several stressors in his life that are contributing. On top of his medical issues and the unknown of the future, he tells met hat they lost his daughter unexpectedly at age 31 in 2020. His mother and father have also passed. His mother had progressive dementia. His father passed from a stroke. He also had ESRD and was on HD.     He tells me that his PCP is prescribing him xanax, at his request. He tells me that he has \"panic episodes\" that require the use of a benzo. He tells me that during radiation treatment, he would need 1-2 mg of lorazepam to complete treatments.    Prostate biopsy (9/5) - adenocarcinoma, Clearfield score of 8  PEG removed (9/11)    He has underwent Da Donte robotic-assisted laparoscopic radical prostatectomy and bilateral pelvic lymph node dissection.     Surgical pathology showed Clearfield Grade Group 4 (3+5=8), as he had extrapostatic extension and perineural invasion.   Urology notes that he is having to wear absorbant briefs, which is uncomfortable for him to discuss. He hasn't started rehab for pelvic floor strengthening. He is frustrated that he hasn't started rehab for this. Not able to get in for another 2 weeks or so. No control over his bladder/urination at this point.      Interval history: 4/30/2025:  Devin was seen today in the palliative care clinic. He has recently returned from [place] following a 3 month stay.     Him and his wife have a shelter home there. They plan to return to [place]

## 2025-05-05 ENCOUNTER — HOSPITAL ENCOUNTER (OUTPATIENT)
Dept: GENERAL RADIOLOGY | Age: 70
Discharge: HOME | End: 2025-05-07
Attending: STUDENT IN AN ORGANIZED HEALTH CARE EDUCATION/TRAINING PROGRAM
Payer: MEDICARE

## 2025-05-05 DIAGNOSIS — R13.10 DYSPHAGIA, UNSPECIFIED TYPE: ICD-10-CM

## 2025-05-05 PROCEDURE — 92611 MOTION FLUOROSCOPY/SWALLOW: CPT

## 2025-05-05 PROCEDURE — 74230 X-RAY XM SWLNG FUNCJ C+: CPT

## 2025-05-05 NOTE — PROCEDURES
INSTRUMENTAL SWALLOW REPORT  MODIFIED BARIUM SWALLOW    NAME: Devin Arthur   : 1955  MRN: [id number]       Date of Eval: 2025              Referring Diagnosis(es): Dysphagia, Unspecified    Past Medical History:  has a past medical history of Benign prostatic hyperplasia with lower urinary tract symptoms, Cancer (HCC), Chronic sinus complaints, Claustrophobia, Depression, Dry mouth, Elevated PSA, Generalized anxiety disorder, History of chemotherapy, History of radiation therapy, Hx of blood clots, Hypoglycemia, Port-A-Cath in place, Prolonged emergence from general anesthesia, Squamous cell carcinoma of tonsil (HCC), Tonsillar cancer (HCC), Under care of team, Under care of team, Uses feeding tube, and Wellness examination.  Past Surgical History:  has a past surgical history that includes shoulder surgery (Right, ); Portacath placement; Colonoscopy; Neck surgery; Arm Surgery (Left); Gastrostomy tube placement; Prostate surgery (N/A, 2024); Prostatectomy (2024); Prostatectomy (N/A, 2024); and Port Surgery (Right, 2025).    Type of Study: Initial MBS      Patient Complaints/Reason for Referral:  Devin Arthur was referred for a MBS to assess the efficiency of his/her swallow function, assess for aspiration, and to make recommendations regarding safe dietary consistencies, effective compensatory strategies, and safe eating environment.    Behavior/Cognition/Vision/Hearing:  Behavior/Cognition: Cooperative;Alert;Pleasant mood  Vision: Within Functional Limits  Hearing: Within functional limits    Impressions:  Treatment Dx and ICD 10: Dysphagia, Oropharyngeal Phase R13.12   Patient Position: Lateral     Consistencies Administered: Regular;Soft & Bite Sized;Pureed;Thin cup;Thin straw;Mildly Thick straw;Mildly Thick cup    Dysphagia Outcome Severity Scale: Level 6: Within functional limits/Modified independence  Penetration-Aspiration Scale (PAS): 4 - Material enters the airway,

## 2025-05-13 ENCOUNTER — HOSPITAL ENCOUNTER (OUTPATIENT)
Dept: PHYSICAL THERAPY | Facility: CLINIC | Age: 70
Setting detail: THERAPIES SERIES
Discharge: HOME | End: 2025-05-13
Payer: MEDICARE

## 2025-05-13 PROCEDURE — 97110 THERAPEUTIC EXERCISES: CPT

## 2025-05-13 PROCEDURE — 97140 MANUAL THERAPY 1/> REGIONS: CPT

## 2025-05-13 PROCEDURE — 97032 APPL MODALITY 1+ESTIM EA 15: CPT

## 2025-05-13 PROCEDURE — 90912 BFB TRAINING 1ST 15 MIN: CPT

## 2025-05-13 NOTE — FLOWSHEET NOTE
[] [facility]  Outpatient Rehabilitation &  Therapy  [address]  P: [phone]  F: [phone] [x] [facility]  Outpatient Rehabilitation &  Therapy  [address]  P: [phone]  F: [phone]     Physical Therapy Daily Treatment Note    Date:  2025  Patient Name:  Devin Arthur    :  1955  MRN: [id number]  Physician: Douglas Silva                                                               Insurance: [facility] Medicare Advantage (Auth after eval, vs per [place], $30 co-pay per vs)Approved 6 visits 4.8.25-63 , medical review for remaining 6 visits   Medical Diagnosis: Stress Incontinence N39.3                 Rehab Codes: M62.50, N39.3  Onset Date: 24                                  Next 's appt: 10/6/2025     Visit# / total visits: ; Progress note for Medicare patient due at visit 10     Cancels/No Shows:     Subjective:    Pain:  [] Yes  [x] No Location:  N/A Pain Rating: (0-10 scale) 0/10  Pain altered Tx:  [x] No  [] Yes  Action:  Comments: Pt reports feeling improvement with less leaks overall.  Cont occ leak with activity esperanza if he forgets to squeeze muscles when getting up from a chair or cough.  Stays mostly dry at night, occ slight leak.  Pt cont to be up every 2 hours at night to void, and states the urge to go wakes him up.    Objective:  Modalities:   Precautions [x] No  [] Yes:   Exercises:  Exercise Reps/ Time Weight/ Level Comments   Male PF pic explanation [x]     Review   Urine stop test  [x]     Review   PF ex: short holds  40-50x  1-2 sec     PF ex: long holds  40-50x  10 x 5 sec     The knack ex  [x]     Review    TA ex  5\"x10    with PF   bridges 10  With PF   3 way clamshells 10  With PF   Other:     Treatment Charges: Mins Units   [x]  Modalities: PF ES 15 1   [x]  Ther Exercise 10 1   []  Neuromuscular Re-ed     []  Gait Training     []  Manual Therapy     []  Ther Activities     []  Aquatics

## 2025-05-13 NOTE — FLOWSHEET NOTE
[] [facility]  Outpatient Rehabilitation &  Therapy  [address]  [phone]  [phone] [] [facility]  Outpatient Rehabilitation &  Therapy  [address]  P: [phone]  F: [phone] [x] [facility]  Outpatient Rehabilitation &  Therapy  [address]  P: [phone]  F: [phone] [] [facility]  Outpatient Rehabilitation &  Therapy  [address]  [phone]  [phone] [] [facility]  Outpatient Rehabilitation &  Therapy  [address]   P: [phone]  F: [phone]  [] [facility]  Outpatient Rehabilitation &  Therapy  [address]  P: [phone]  F: [phone] [] [facility]  Outpatient Rehabilitation &  Therapy  [address]  P: [phone]  F: [phone] [] [facility]  Outpatient Rehabilitation &  Therapy  [address]  P: [phone]  F: [phone] [] [facility]  Outpatient Rehabilitation &  Therapy  [address]  P: [phone]  F: [phone]  [] [facility] Outpatient Rehabilitation &  Therapy  [address]  P: [phone]  F: [phone]     Physical Therapy Daily Treatment Note    Date:  2025  Patient Name:  Devin Arthur    :  1955  MRN: [id number]  Physician: Chin Atkinson MD                   Insurance: [facility]/ [facility] MEDICARE ADV $20 co pay AUTH AFTER EVAL, 18vs of PT from 24-3/24/25 auth#[id number].  12 addtl vs from -25 auth#[id number].   Medical Diagnosis: Tonsillar cancer            Rehab Codes: C09.9, R59.0, 25.611, 25.612, R53.0, L90.5, R29.3   Onset date: 24 script                            Next Dr's appt.: ongoing  PVisit# / total visits:  ( ends 25)    Cancels/No Shows: 0    Subjective:  Pt again coming from pelvic floor PT, states

## 2025-05-15 ENCOUNTER — TELEPHONE (OUTPATIENT)
Age: 70
End: 2025-05-15

## 2025-05-15 NOTE — TELEPHONE ENCOUNTER
[facility]- MEDICAL NUTRITION THERAPY     Visit Type: Follow-up     NUTRITION RECOMMENDATIONS / MONITORING / EVALUATION  Continue Regular diet as tolerated.   Continue oral nutrition supplements as needed; adjust amount based on meal intake.   Will follow as needed. Encouraged spouse to call with any nutrition questions/concerns.     Subjective/Current Data:  Devin Arthur is a 69 y.o. male with PMH as listed below.  Chart reviewed and called primary number listed in chart for nutrition follow-up; patient's wife, Miryam, answered. Spouse reports pt is swallowing much better and that patient has been working with SLP. Noted patient passed swallow study for Regular/easy to chew solid consistency with thin liquids on 5/5/25.   Spouse reports pt is eating fairly well. Still using Boost VHC daily (1.5 -2 cans/day). Weight is improving and almost back to usual weight of 165-170 lb. Spouse reports constipation has resolved; pt is now using a stool softener daily.     Objective Data:  Patient Active Problem List    Diagnosis Date Noted    Cancer of tonsil (HCC) 12/10/2024    Prostate cancer (HCC) 11/01/2024    Prostate CA (HCC) 11/01/2024    Insomnia due to medical condition 07/31/2024    Squamous cell carcinoma of tonsil (HCC) 07/31/2024    Episode of recurrent major depressive disorder 07/31/2024    Benign prostatic hyperplasia with lower urinary tract symptoms 07/31/2024    Generalized anxiety disorder 07/31/2024    Tonsillar cancer (HCC) 06/28/2024     Anthropometric Measures:  Height: 6' 1\"  Weight: 162 lb 1.6 oz (73.5 kg)    Ideal Body Weight: 184 lb (83.6 kg)  BMI: 21.39 kg/m2 (Normal Weight)      Wt Readings from Last 20 Encounters:   04/30/25 73.5 kg (162 lb 1.6 oz)   04/15/25 71.2 kg (157 lb)   04/15/25 71.3 kg (157 lb 3.2 oz)   04/09/25 71.2 kg (157 lb)   04/08/25 71.5 kg (157 lb 9.6 oz)   04/07/25 68 kg (150 lb)   03/31/25 68 kg (150 lb)   12/10/24 69 kg (152 lb 3.2 oz)   12/04/24 68 kg (150 lb)   12/03/24

## 2025-05-21 ENCOUNTER — TELEPHONE (OUTPATIENT)
Dept: UROLOGY | Age: 70
End: 2025-05-21

## 2025-05-21 NOTE — TELEPHONE ENCOUNTER
Rufus ORNELAS #[id number] is pending, chart notes and lab's and imaging has been faxed over to [phone] for approval.

## 2025-05-23 ENCOUNTER — TELEPHONE (OUTPATIENT)
Dept: UROLOGY | Age: 70
End: 2025-05-23

## 2025-05-26 DIAGNOSIS — G47.01 INSOMNIA DUE TO MEDICAL CONDITION: ICD-10-CM

## 2025-05-29 ENCOUNTER — APPOINTMENT (OUTPATIENT)
Dept: PHYSICAL THERAPY | Facility: CLINIC | Age: 70
End: 2025-05-29
Payer: MEDICARE

## 2025-05-30 DIAGNOSIS — F41.1 GENERALIZED ANXIETY DISORDER: ICD-10-CM

## 2025-05-30 DIAGNOSIS — G47.01 INSOMNIA DUE TO MEDICAL CONDITION: ICD-10-CM

## 2025-05-30 RX ORDER — TRAZODONE HYDROCHLORIDE 50 MG/1
TABLET ORAL
Qty: 30 TABLET | Refills: 0 | OUTPATIENT
Start: 2025-05-30

## 2025-05-30 RX ORDER — LORAZEPAM 0.5 MG/1
0.5 TABLET ORAL EVERY 8 HOURS PRN
Qty: 270 TABLET | Refills: 0 | Status: SHIPPED | OUTPATIENT
Start: 2025-05-30 | End: 2025-08-28

## 2025-05-30 RX ORDER — TRAZODONE HYDROCHLORIDE 50 MG/1
50 TABLET ORAL NIGHTLY
Qty: 90 TABLET | Refills: 0 | Status: SHIPPED | OUTPATIENT
Start: 2025-05-30 | End: 2025-08-28

## 2025-06-04 ENCOUNTER — PROCEDURE VISIT (OUTPATIENT)
Dept: UROLOGY | Age: 70
End: 2025-06-04
Payer: MEDICARE

## 2025-06-04 VITALS
BODY MASS INDEX: 21.74 KG/M2 | SYSTOLIC BLOOD PRESSURE: 118 MMHG | WEIGHT: 164 LBS | OXYGEN SATURATION: 96 % | TEMPERATURE: 98.1 F | HEART RATE: 86 BPM | DIASTOLIC BLOOD PRESSURE: 74 MMHG | HEIGHT: 73 IN

## 2025-06-04 DIAGNOSIS — C61 PROSTATE CANCER (HCC): Primary | ICD-10-CM

## 2025-06-04 PROCEDURE — 96402 CHEMO HORMON ANTINEOPL SQ/IM: CPT | Performed by: NURSE PRACTITIONER

## 2025-06-04 ASSESSMENT — ENCOUNTER SYMPTOMS
VOMITING: 0
COUGH: 0
SHORTNESS OF BREATH: 0
NAUSEA: 0
BACK PAIN: 0
RESPIRATORY NEGATIVE: 1
EYE PAIN: 0
EYE REDNESS: 0
WHEEZING: 0
ALLERGIC/IMMUNOLOGIC NEGATIVE: 1
GASTROINTESTINAL NEGATIVE: 1
ABDOMINAL PAIN: 0
EYES NEGATIVE: 1
COLOR CHANGE: 0

## 2025-06-04 NOTE — PROGRESS NOTES
DX: Prostate cancer  After obtaining  written and verbal consent, and per orders of Audrey Ellington CNP, Eligard 45mg administered in RUQ of abdomen by Fannie Springer RN. Patient was instructed to remain in clinic for 20 mins following injection and report any adverse reactions to me immediately. He tolerated the injection without any complications. We reviewed that the side effects include hot flashes, fatigue, breast enlargement, diminished libido, ED and long term development of osteoporosis.  The patient was told he will encouraged to take supplemental Calcium and Vitamin D to prevent the latter.

## 2025-08-11 ENCOUNTER — TELEPHONE (OUTPATIENT)
Dept: RADIATION ONCOLOGY | Age: 70
End: 2025-08-11

## 2025-08-13 ENCOUNTER — TELEPHONE (OUTPATIENT)
Age: 70
End: 2025-08-13

## 2025-08-26 ENCOUNTER — TELEPHONE (OUTPATIENT)
Age: 70
End: 2025-08-26

## 2025-08-28 DIAGNOSIS — G47.01 INSOMNIA DUE TO MEDICAL CONDITION: ICD-10-CM

## 2025-08-29 RX ORDER — TRAZODONE HYDROCHLORIDE 50 MG/1
TABLET ORAL
Qty: 90 TABLET | Refills: 0 | Status: SHIPPED | OUTPATIENT
Start: 2025-08-29

## (undated) DEVICE — SYRINGE MED 10ML LUERLOCK TIP W/O SFTY DISP

## (undated) DEVICE — SUTURE VICRYL + SZ 3-0 L27IN ABSRB UD L26MM SH 1/2 CIR VCP416H

## (undated) DEVICE — SUTURE STRATAFIX SPRL PDS + SZ 0 L9IN ABSRB VLT CT-1 L36MM SXPP1B455

## (undated) DEVICE — SOLUTION IRRIG 1000ML STRL H2O USP PLAS POUR BTL

## (undated) DEVICE — ST. CHARLES BASIC SET UP: Brand: MEDLINE INDUSTRIES, INC.

## (undated) DEVICE — SEALANT TISS 10 ML FIBRIN VISTASEAL

## (undated) DEVICE — CATHETER URETH 18FR BLLN 30CC 3 W F SPEC INF CTRL BARDX

## (undated) DEVICE — BLADE CLIPPER GEN PURP NS

## (undated) DEVICE — ELECTRO LUBE IS A SINGLE PATIENT USE DEVICE THAT IS INTENDED TO BE USED ON ELECTROSURGICAL ELECTRODES TO REDUCE STICKING.: Brand: KEY SURGICAL ELECTRO LUBE

## (undated) DEVICE — SUTURE MONOCRYL SZ 4-0 L18IN ABSRB UD L16MM PC-3 3/8 CIR PRIM Y845G

## (undated) DEVICE — NEEDLE HYPO 25GA L1.5IN BLU POLYPR HUB S STL REG BVL STR

## (undated) DEVICE — MAX-CORE® DISPOSABLE CORE BIOPSY INSTRUMENT, 18G X 20CM: Brand: MAX-CORE

## (undated) DEVICE — ARM DRAPE

## (undated) DEVICE — APPLICATOR MEDICATED 26 CC SOLUTION HI LT ORNG CHLORAPREP

## (undated) DEVICE — SYRINGE CATH TIP 50ML

## (undated) DEVICE — SCISSOR SURG METZ CRV TIP

## (undated) DEVICE — AIRSEAL 12 MM ACCESS PORT AND PALM GRIP OBTURATOR WITH BLADELESS OPTICAL TIP, 120 MM LENGTH: Brand: AIRSEAL

## (undated) DEVICE — INSUFFLATION NEEDLE TO ESTABLISH PNEUMOPERITONEUM.: Brand: INSUFFLATION NEEDLE

## (undated) DEVICE — DRAPE,T,LAPARO,TRANS,STERILE: Brand: MEDLINE

## (undated) DEVICE — TROCAR: Brand: KII FIOS FIRST ENTRY

## (undated) DEVICE — STRAP,CATHETER,ELASTIC,HOOK&LOOP: Brand: MEDLINE

## (undated) DEVICE — APPLICATOR LAP 45CM FLX 2 VISTASEAL

## (undated) DEVICE — SYRINGE MED 50ML LUERLOCK TIP

## (undated) DEVICE — SPINAL BIOPSY NEEDLE 22GA X 20CM: Brand: SPINAL BIOPSY NEEDLE

## (undated) DEVICE — PAD PT POS 36 IN SURGYPAD DISP

## (undated) DEVICE — GLOVE ORANGE PI 7 1/2   MSG9075

## (undated) DEVICE — DRESSING TRNSPAR W5XL4.5IN FLM SHT SEMIPERMEABLE WIND

## (undated) DEVICE — SEAL

## (undated) DEVICE — Z DISCONTINUED USE 2220190 SUTURE VICRYL SZ 3-0 L27IN ABSRB UD L26MM SH 1/2 CIR J416H

## (undated) DEVICE — PROTECTOR ULN NRV PUR FOAM HK LOOP STRP ANATOMICALLY

## (undated) DEVICE — SOLUTION ANTIFOG VIS SYS CLEARIFY LAPSCP

## (undated) DEVICE — SUTURE VICRYL SZ 0 L27IN ABSRB UD L36MM CT-1 1/2 CIR J260H

## (undated) DEVICE — Device

## (undated) DEVICE — TOWEL,OR,DSP,ST,NATURAL,DLX,4/PK,20PK/CS: Brand: MEDLINE

## (undated) DEVICE — PAD,NON-ADHERENT,3X8,STERILE,LF,1/PK: Brand: MEDLINE

## (undated) DEVICE — SUTURE STRATAFIX SPRL PDS + SZ 3 0 L6IN ABSRB VLT RB 1 L17MM SXPP1B422

## (undated) DEVICE — TRI-LUMEN FILTERED TUBE SET WITH ACTIVATED CHARCOAL FILTER: Brand: AIRSEAL

## (undated) DEVICE — COVER,LIGHT HANDLE,FLX,2/PK: Brand: MEDLINE INDUSTRIES, INC.

## (undated) DEVICE — LIQUIBAND RAPID ADHESIVE 36/CS 0.8ML: Brand: MEDLINE

## (undated) DEVICE — SHEET,DRAPE,70X100,STERILE: Brand: MEDLINE

## (undated) DEVICE — Z DISCONTINUED USE 2220295 SUTURE VICRYL SZ 0 L18IN ABSRB UD L36MM CT-1 1/2 CIR J840D

## (undated) DEVICE — METER,URINE,400ML,DRAIN BAG,L/F,LL: Brand: MEDLINE

## (undated) DEVICE — CONTAINER,SPECIMEN,4OZ,OR STRL: Brand: MEDLINE

## (undated) DEVICE — 3M™ IOBAN™ 2 ANTIMICROBIAL INCISE DRAPE 6650EZ: Brand: IOBAN™ 2

## (undated) DEVICE — SUTURE VICRYL SZ 0 L18IN ABSRB VLT L36MM CT-1 1/2 CIR J740D

## (undated) DEVICE — TISSUE RETRIEVAL SYSTEM: Brand: INZII RETRIEVAL SYSTEM

## (undated) DEVICE — COUNTER NDL 10 COUNT HLD 20 FOAM BLK SGL MAG

## (undated) DEVICE — APPLICATOR MEDICATED 10.5 CC SOLUTION HI LT ORNG CHLORAPREP

## (undated) DEVICE — CATHETER URETH 20FR BLLN 30CC 2 W F INF CTRL BARDX

## (undated) DEVICE — BLADE,CARBON-STEEL,15,STRL,DISPOSABLE,TB: Brand: MEDLINE

## (undated) DEVICE — GLOVE ORTHO 7 1/2   MSG9475

## (undated) DEVICE — SOLUTION IRRIG 1000ML 0.9% SOD CHL USP POUR PLAS BTL